# Patient Record
Sex: FEMALE | Race: WHITE | NOT HISPANIC OR LATINO | Employment: FULL TIME | URBAN - METROPOLITAN AREA
[De-identification: names, ages, dates, MRNs, and addresses within clinical notes are randomized per-mention and may not be internally consistent; named-entity substitution may affect disease eponyms.]

---

## 2017-10-26 LAB — EXTERNAL GROUP B STREP ANTIGEN: NEGATIVE

## 2017-11-06 LAB
EXTERNAL ANTIBODY SCREEN: NEGATIVE
EXTERNAL HEPATITIS B SURFACE ANTIGEN: NEGATIVE
EXTERNAL HEPATITIS C AB: NORMAL
EXTERNAL RUBELLA QUALITATIVE: NORMAL
EXTERNAL SYPHILIS RPR SCREEN: NORMAL
HIV1 P24 AG SERPL QL IA: NEGATIVE

## 2017-11-07 ENCOUNTER — HOSPITAL ENCOUNTER (OUTPATIENT)
Dept: LABOR AND DELIVERY | Facility: HOSPITAL | Age: 42
Discharge: HOME | End: 2017-11-07
Attending: OBSTETRICS & GYNECOLOGY | Admitting: OBSTETRICS & GYNECOLOGY

## 2017-11-07 VITALS
SYSTOLIC BLOOD PRESSURE: 115 MMHG | RESPIRATION RATE: 16 BRPM | BODY MASS INDEX: 28.19 KG/M2 | HEART RATE: 83 BPM | TEMPERATURE: 98.1 F | HEIGHT: 68 IN | DIASTOLIC BLOOD PRESSURE: 72 MMHG | WEIGHT: 186 LBS

## 2017-11-07 LAB
ABO GROUP BLD: NORMAL
BASOPHILS # BLD AUTO: 0.02 10*3/MM3 (ref 0–0.2)
BASOPHILS NFR BLD AUTO: 0.3 % (ref 0–1.5)
BLD GP AB SCN SERPL QL: NEGATIVE
DEPRECATED RDW RBC AUTO: 47.9 FL (ref 37–54)
EOSINOPHIL # BLD AUTO: 0.13 10*3/MM3 (ref 0–0.7)
EOSINOPHIL NFR BLD AUTO: 1.7 % (ref 0.3–6.2)
ERYTHROCYTE [DISTWIDTH] IN BLOOD BY AUTOMATED COUNT: 13.3 % (ref 11.7–13)
HCT VFR BLD AUTO: 35.6 % (ref 35.6–45.5)
HGB BLD-MCNC: 12.1 G/DL (ref 11.9–15.5)
IMM GRANULOCYTES # BLD: 0.05 10*3/MM3 (ref 0–0.03)
IMM GRANULOCYTES NFR BLD: 0.6 % (ref 0–0.5)
LYMPHOCYTES # BLD AUTO: 1.25 10*3/MM3 (ref 0.9–4.8)
LYMPHOCYTES NFR BLD AUTO: 16.2 % (ref 19.6–45.3)
MCH RBC QN AUTO: 33.8 PG (ref 26.9–32)
MCHC RBC AUTO-ENTMCNC: 34 G/DL (ref 32.4–36.3)
MCV RBC AUTO: 99.4 FL (ref 80.5–98.2)
MONOCYTES # BLD AUTO: 0.67 10*3/MM3 (ref 0.2–1.2)
MONOCYTES NFR BLD AUTO: 8.7 % (ref 5–12)
NEUTROPHILS # BLD AUTO: 5.6 10*3/MM3 (ref 1.9–8.1)
NEUTROPHILS NFR BLD AUTO: 72.5 % (ref 42.7–76)
PLATELET # BLD AUTO: 150 10*3/MM3 (ref 140–500)
PMV BLD AUTO: 11.7 FL (ref 6–12)
RBC # BLD AUTO: 3.58 10*6/MM3 (ref 3.9–5.2)
RH BLD: POSITIVE
WBC NRBC COR # BLD: 7.72 10*3/MM3 (ref 4.5–10.7)

## 2017-11-07 PROCEDURE — 86850 RBC ANTIBODY SCREEN: CPT | Performed by: OBSTETRICS & GYNECOLOGY

## 2017-11-07 PROCEDURE — 86901 BLOOD TYPING SEROLOGIC RH(D): CPT | Performed by: OBSTETRICS & GYNECOLOGY

## 2017-11-07 PROCEDURE — 86900 BLOOD TYPING SEROLOGIC ABO: CPT | Performed by: OBSTETRICS & GYNECOLOGY

## 2017-11-07 PROCEDURE — 96375 TX/PRO/DX INJ NEW DRUG ADDON: CPT

## 2017-11-07 PROCEDURE — 59412 ANTEPARTUM MANIPULATION: CPT

## 2017-11-07 PROCEDURE — 96374 THER/PROPH/DIAG INJ IV PUSH: CPT

## 2017-11-07 PROCEDURE — 85025 COMPLETE CBC W/AUTO DIFF WBC: CPT | Performed by: OBSTETRICS & GYNECOLOGY

## 2017-11-07 PROCEDURE — 25010000002 BUTORPHANOL PER 1 MG: Performed by: OBSTETRICS & GYNECOLOGY

## 2017-11-07 PROCEDURE — 25010000002 TERBUTALINE PER 1 MG: Performed by: OBSTETRICS & GYNECOLOGY

## 2017-11-07 PROCEDURE — G0463 HOSPITAL OUTPT CLINIC VISIT: HCPCS

## 2017-11-07 PROCEDURE — 25010000002 PROMETHAZINE PER 50 MG: Performed by: OBSTETRICS & GYNECOLOGY

## 2017-11-07 RX ORDER — DIPHENHYDRAMINE HCL 25 MG
25 CAPSULE ORAL EVERY 6 HOURS PRN
COMMUNITY
End: 2019-01-30

## 2017-11-07 RX ORDER — PRENATAL VIT/IRON FUM/FOLIC AC 27MG-0.8MG
1 TABLET ORAL DAILY
COMMUNITY
End: 2019-01-30

## 2017-11-07 RX ORDER — PROMETHAZINE HYDROCHLORIDE 25 MG/ML
25 INJECTION, SOLUTION INTRAMUSCULAR; INTRAVENOUS ONCE
Status: COMPLETED | OUTPATIENT
Start: 2017-11-07 | End: 2017-11-07

## 2017-11-07 RX ORDER — TERBUTALINE SULFATE 1 MG/ML
0.25 INJECTION, SOLUTION SUBCUTANEOUS ONCE
Status: COMPLETED | OUTPATIENT
Start: 2017-11-07 | End: 2017-11-07

## 2017-11-07 RX ORDER — BUTORPHANOL TARTRATE 1 MG/ML
1 INJECTION, SOLUTION INTRAMUSCULAR; INTRAVENOUS ONCE
Status: COMPLETED | OUTPATIENT
Start: 2017-11-07 | End: 2017-11-07

## 2017-11-07 RX ORDER — ZOLPIDEM TARTRATE 10 MG/1
TABLET ORAL
COMMUNITY
Start: 2016-07-22 | End: 2017-11-17 | Stop reason: HOSPADM

## 2017-11-07 RX ADMIN — BUTORPHANOL TARTRATE 1 MG: 1 INJECTION, SOLUTION INTRAMUSCULAR; INTRAVENOUS at 09:26

## 2017-11-07 RX ADMIN — TERBUTALINE SULFATE 0.25 MG: 1 INJECTION SUBCUTANEOUS at 09:25

## 2017-11-07 RX ADMIN — PROMETHAZINE HYDROCHLORIDE 25 MG: 25 INJECTION INTRAMUSCULAR; INTRAVENOUS at 09:31

## 2017-11-07 NOTE — H&P
[facility]  Obstetric History and Physical    Patient Name: Sil Ly  :  1975  MRN:  [id number]      Chief Complaint   Patient presents with   • Routine Prenatal Visit     External version       Subjective     Patient is a 42 y.o. female  currently at 36w5d, who presents for ecv secondary to breech presentation.         Her prenatal care has been  complicated by AMA, poly with an milan of 30 and hx of ivf      Objective       Vital Signs Range for the last 24 hours  Temperature: Temp:  [98.1 °F (36.7 °C)] 98.1 °F (36.7 °C)   Temp Source: Temp src: Oral   BP: BP: (113-115)/(68-72) 115/72   Pulse: Heart Rate:  [83-88] 83   Respirations: Resp:  [16] 16                   Physical Examination:     General :  Alert in NAD  Abdomen: Gravid, EFW breech with head in ruq by leopolds    Presentation:    Cervix: Exam by:     Dilation:     Effacement:     Station:         Fetal Heart Rate Assessment   Method: Fetal HR Assessment Method: external   Beats/min: Fetal HR (Beats/Min): 130   Baseline: Fetal HR Baseline: normal range (110-160 bpm)   Varibility: Fetal HR Variability: moderate (amplitude range 6 to 25 bpm)   Accels: Fetal HR Accelerations: greater than/equal to 15 bpm, lasting at least 15 seconds   Decels: Fetal HR Decelerations: absent   Tracing Category:       Uterine Assessment   Method: Method: TOCO (external toco transducer)   Frequency (min): Contraction Frequency (min): x1   Ctx Count in 10 min: Contraction Frequency in 10min: 50   Duration:     Intensity: Contraction Intensity: no contractions   Intensity by IUPC:     Resting Tone: Uterine Resting Tone: soft by palpation   Resting Tone by IUPC:     [place] Units:             Results from last 7 days  Lab Units 17  0826   WBC 10*3/mm3 7.72   HEMOGLOBIN g/dL 12.1   HEMATOCRIT % 35.6   PLATELETS 10*3/mm3 150       Assessment/Plan     1.  Intrauterine pregnancy at 36w5d weeks gestation with breech pres who desires trial of ecv. She  understands risks of pprom, labor, distress and reversion. FSR on nst and bpp.        gestationActive Problems:    * No active hospital problems. *        H&P updated. The patient was examined and the following changes are noted above.         Montserrat Rangel MD  11/7/2017  9:53 AM

## 2017-11-07 NOTE — DISCHARGE SUMMARY
Date of Discharge:  11/7/2017    Discharge Diagnosis: breech with ecv successful    Presenting Problem/History of Present Illness  There are no admission diagnoses documented for this encounter.       Hospital Course  Patient is a 42 y.o. female presented with for ecv for breech. Done without difficulty. fsr.      Procedures Performed    11/07 0954 EXTERNAL CEPHALIC VERSION    Consults:   Consults     No orders found from 10/9/2017 to 11/8/2017.              Condition on Discharge:  Feels well.  Reports good FM.  No vb.  Ready for discharge    Vital Signs  Temp:  [98.1 °F (36.7 °C)] 98.1 °F (36.7 °C)  Heart Rate:  [83-88] 83  Resp:  [16] 16  BP: (113-115)/(68-72) 115/72    Physical Exam:  General: Awake and alert  Abdomen: Gravid, soft,  non tender  Extremities:  Calves NT bilaterally        Discharge Disposition      Discharge Medications   Sil Ly   Home Medication Instructions SENDY:[id number]    Printed on:11/07/17 1056   Medication Information                      ALPRAZolam (XANAX) 0.5 MG tablet  TAKE 1 TABLET BY MOUTH EVERY DAY             diphenhydrAMINE (BENADRYL) 25 mg capsule  Take 25 mg by mouth Every 6 (Six) Hours As Needed for Itching.             escitalopram (LEXAPRO) 10 MG tablet  TAKE 1 TABLET DAILY             Prenatal Vit-Fe Fumarate-FA (PRENATAL VITAMIN 27-0.8) 27-0.8 MG tablet tablet  Take 1 tablet by mouth Daily.             zolpidem (AMBIEN) 10 MG tablet  TAKE 1 TABLET BY MOUTH AT BEDTIME             zolpidem (AMBIEN) 10 MG tablet  TAKE 1 TABLET BY MOUTH AT BEDTIME                 Discharge Diet:     Activity at Discharge:     Follow-up Appointments  No future appointments.      Test Results Pending at Discharge       Montserrat Rangel MD  11/07/17  10:58 AM

## 2017-11-07 NOTE — NON STRESS TEST
Sil Ly, a  at 36w5d with an DACIA of 2017, by Patient Reported, was seen at [facility] LABOR DELIVERY for a nonstress test.    Chief Complaint   Patient presents with   • Routine Prenatal Visit     External version       Interpretation A  Nonstress Test Interpretation A: Reactive (17 1428 : Lesia Booth RN)

## 2017-11-07 NOTE — PROCEDURES
Pt brought to l/d with breech confirmed and nst reactive, reassuring with no decels. bsus with head is right upper quadrant. Pt given terbutaline, phenergan and stadol. With assistance from dr mercado, the head was grasped and brought to the pelvis on the first try which lasted approximately 2 minutes.  Dr mercado had pressure on the bottom. FHTs immediately checked and were 140s. Pt will be watched for one hour to make sure nst reassuring. ROXANNA

## 2017-11-14 ENCOUNTER — HOSPITAL ENCOUNTER (INPATIENT)
Facility: HOSPITAL | Age: 42
LOS: 3 days | Discharge: HOME | End: 2017-11-17
Attending: OBSTETRICS & GYNECOLOGY | Admitting: OBSTETRICS & GYNECOLOGY

## 2017-11-14 ENCOUNTER — ANESTHESIA (OUTPATIENT)
Dept: LABOR AND DELIVERY | Facility: HOSPITAL | Age: 42
End: 2017-11-14

## 2017-11-14 ENCOUNTER — ANESTHESIA EVENT (OUTPATIENT)
Dept: LABOR AND DELIVERY | Facility: HOSPITAL | Age: 42
End: 2017-11-14

## 2017-11-14 ENCOUNTER — HOSPITAL ENCOUNTER (OUTPATIENT)
Dept: LABOR AND DELIVERY | Facility: HOSPITAL | Age: 42
Setting detail: SURGERY ADMIT
Discharge: HOME | End: 2017-11-14

## 2017-11-14 PROBLEM — Z34.90 PREGNANCY: Status: ACTIVE | Noted: 2017-11-14

## 2017-11-14 LAB
ABO GROUP BLD: NORMAL
ATMOSPHERIC PRESS: 755.1 MMHG
BASE EXCESS BLDCOV CALC-SCNC: -3 MMOL/L (ref -30–30)
BASOPHILS # BLD AUTO: 0.02 10*3/MM3 (ref 0–0.2)
BASOPHILS NFR BLD AUTO: 0.2 % (ref 0–1.5)
BDY SITE: ABNORMAL
BLD GP AB SCN SERPL QL: NEGATIVE
DEPRECATED RDW RBC AUTO: 46.3 FL (ref 37–54)
EOSINOPHIL # BLD AUTO: 0.14 10*3/MM3 (ref 0–0.7)
EOSINOPHIL NFR BLD AUTO: 1.7 % (ref 0.3–6.2)
ERYTHROCYTE [DISTWIDTH] IN BLOOD BY AUTOMATED COUNT: 13 % (ref 11.7–13)
GAS FLOW AIRWAY: 3 LPM
HCO3 BLDCOV-SCNC: 26.6 MMOL/L
HCT VFR BLD AUTO: 37.6 % (ref 35.6–45.5)
HGB BLD-MCNC: 12.7 G/DL (ref 11.9–15.5)
IMM GRANULOCYTES # BLD: 0.04 10*3/MM3 (ref 0–0.03)
IMM GRANULOCYTES NFR BLD: 0.5 % (ref 0–0.5)
LYMPHOCYTES # BLD AUTO: 1.19 10*3/MM3 (ref 0.9–4.8)
LYMPHOCYTES NFR BLD AUTO: 14.4 % (ref 19.6–45.3)
MCH RBC QN AUTO: 33.2 PG (ref 26.9–32)
MCHC RBC AUTO-ENTMCNC: 33.8 G/DL (ref 32.4–36.3)
MCV RBC AUTO: 98.2 FL (ref 80.5–98.2)
MODALITY: ABNORMAL
MONOCYTES # BLD AUTO: 0.68 10*3/MM3 (ref 0.2–1.2)
MONOCYTES NFR BLD AUTO: 8.2 % (ref 5–12)
NEUTROPHILS # BLD AUTO: 6.19 10*3/MM3 (ref 1.9–8.1)
NEUTROPHILS NFR BLD AUTO: 75 % (ref 42.7–76)
PCO2 BLDCOV: 65.8 MM HG (ref 35–51.3)
PH BLDCOV: 7.21 PH UNITS (ref 7.26–7.4)
PLATELET # BLD AUTO: 156 10*3/MM3 (ref 140–500)
PMV BLD AUTO: 12 FL (ref 6–12)
PO2 BLDCOV: 28.5 MM HG (ref 19–39)
RBC # BLD AUTO: 3.83 10*6/MM3 (ref 3.9–5.2)
RH BLD: POSITIVE
SAO2 % BLDCOA: 40.3 % (ref 92–99)
SAO2 % BLDCOV: ABNORMAL %
WBC NRBC COR # BLD: 8.26 10*3/MM3 (ref 4.5–10.7)

## 2017-11-14 PROCEDURE — 25010000002 ONDANSETRON PER 1 MG: Performed by: ANESTHESIOLOGY

## 2017-11-14 PROCEDURE — 25010000002 MORPHINE PER 10 MG: Performed by: NURSE ANESTHETIST, CERTIFIED REGISTERED

## 2017-11-14 PROCEDURE — 25010000002 KETOROLAC TROMETHAMINE PER 15 MG: Performed by: NURSE ANESTHETIST, CERTIFIED REGISTERED

## 2017-11-14 PROCEDURE — 82803 BLOOD GASES ANY COMBINATION: CPT

## 2017-11-14 PROCEDURE — 86901 BLOOD TYPING SEROLOGIC RH(D): CPT | Performed by: OBSTETRICS & GYNECOLOGY

## 2017-11-14 PROCEDURE — C1755 CATHETER, INTRASPINAL: HCPCS | Performed by: ANESTHESIOLOGY

## 2017-11-14 PROCEDURE — 88307 TISSUE EXAM BY PATHOLOGIST: CPT

## 2017-11-14 PROCEDURE — 25010000003 CEFAZOLIN IN DEXTROSE 2-4 GM/100ML-% SOLUTION: Performed by: OBSTETRICS & GYNECOLOGY

## 2017-11-14 PROCEDURE — 86900 BLOOD TYPING SEROLOGIC ABO: CPT | Performed by: OBSTETRICS & GYNECOLOGY

## 2017-11-14 PROCEDURE — 25010000002 PHENYLEPHRINE PER 1 ML: Performed by: NURSE ANESTHETIST, CERTIFIED REGISTERED

## 2017-11-14 PROCEDURE — 85025 COMPLETE CBC W/AUTO DIFF WBC: CPT | Performed by: OBSTETRICS & GYNECOLOGY

## 2017-11-14 PROCEDURE — 86850 RBC ANTIBODY SCREEN: CPT | Performed by: OBSTETRICS & GYNECOLOGY

## 2017-11-14 RX ORDER — MORPHINE SULFATE 2 MG/ML
2 INJECTION, SOLUTION INTRAMUSCULAR; INTRAVENOUS EVERY 4 HOURS PRN
Status: DISCONTINUED | OUTPATIENT
Start: 2017-11-14 | End: 2017-11-17 | Stop reason: HOSPADM

## 2017-11-14 RX ORDER — SODIUM CHLORIDE 0.9 % (FLUSH) 0.9 %
1-10 SYRINGE (ML) INJECTION AS NEEDED
Status: DISCONTINUED | OUTPATIENT
Start: 2017-11-14 | End: 2017-11-17 | Stop reason: HOSPADM

## 2017-11-14 RX ORDER — FAMOTIDINE 10 MG/ML
20 INJECTION, SOLUTION INTRAVENOUS ONCE AS NEEDED
Status: DISCONTINUED | OUTPATIENT
Start: 2017-11-14 | End: 2017-11-14 | Stop reason: HOSPADM

## 2017-11-14 RX ORDER — ONDANSETRON 2 MG/ML
4 INJECTION INTRAMUSCULAR; INTRAVENOUS ONCE AS NEEDED
Status: ACTIVE | OUTPATIENT
Start: 2017-11-14 | End: 2017-11-15

## 2017-11-14 RX ORDER — HYDROMORPHONE HYDROCHLORIDE 1 MG/ML
0.5 INJECTION, SOLUTION INTRAMUSCULAR; INTRAVENOUS; SUBCUTANEOUS
Status: DISCONTINUED | OUTPATIENT
Start: 2017-11-14 | End: 2017-11-17 | Stop reason: HOSPADM

## 2017-11-14 RX ORDER — OXYTOCIN/RINGER'S LACTATE 10/500ML
PLASTIC BAG, INJECTION (ML) INTRAVENOUS CONTINUOUS PRN
Status: DISCONTINUED | OUTPATIENT
Start: 2017-11-14 | End: 2017-11-14 | Stop reason: SURG

## 2017-11-14 RX ORDER — OXYCODONE HYDROCHLORIDE AND ACETAMINOPHEN 5; 325 MG/1; MG/1
1 TABLET ORAL EVERY 4 HOURS PRN
Status: DISCONTINUED | OUTPATIENT
Start: 2017-11-14 | End: 2017-11-17 | Stop reason: HOSPADM

## 2017-11-14 RX ORDER — OXYTOCIN/RINGER'S LACTATE 10/500ML
125 PLASTIC BAG, INJECTION (ML) INTRAVENOUS CONTINUOUS PRN
Status: DISCONTINUED | OUTPATIENT
Start: 2017-11-14 | End: 2017-11-14 | Stop reason: HOSPADM

## 2017-11-14 RX ORDER — ACETAMINOPHEN 325 MG/1
650 TABLET ORAL EVERY 4 HOURS PRN
Status: DISCONTINUED | OUTPATIENT
Start: 2017-11-14 | End: 2017-11-17 | Stop reason: HOSPADM

## 2017-11-14 RX ORDER — ONDANSETRON 4 MG/1
4 TABLET, ORALLY DISINTEGRATING ORAL EVERY 6 HOURS PRN
Status: DISCONTINUED | OUTPATIENT
Start: 2017-11-14 | End: 2017-11-17 | Stop reason: HOSPADM

## 2017-11-14 RX ORDER — PROMETHAZINE HYDROCHLORIDE 25 MG/1
12.5 SUPPOSITORY RECTAL EVERY 6 HOURS PRN
Status: DISCONTINUED | OUTPATIENT
Start: 2017-11-14 | End: 2017-11-17 | Stop reason: HOSPADM

## 2017-11-14 RX ORDER — SIMETHICONE 80 MG
80 TABLET,CHEWABLE ORAL 4 TIMES DAILY PRN
Status: DISCONTINUED | OUTPATIENT
Start: 2017-11-14 | End: 2017-11-17 | Stop reason: HOSPADM

## 2017-11-14 RX ORDER — HYDROXYZINE 50 MG/1
50 TABLET, FILM COATED ORAL EVERY 6 HOURS PRN
Status: DISCONTINUED | OUTPATIENT
Start: 2017-11-14 | End: 2017-11-17 | Stop reason: HOSPADM

## 2017-11-14 RX ORDER — PROMETHAZINE HYDROCHLORIDE 25 MG/ML
12.5 INJECTION, SOLUTION INTRAMUSCULAR; INTRAVENOUS EVERY 6 HOURS PRN
Status: DISCONTINUED | OUTPATIENT
Start: 2017-11-14 | End: 2017-11-17 | Stop reason: HOSPADM

## 2017-11-14 RX ORDER — NALOXONE HCL 0.4 MG/ML
0.4 VIAL (ML) INJECTION
Status: DISCONTINUED | OUTPATIENT
Start: 2017-11-14 | End: 2017-11-17 | Stop reason: HOSPADM

## 2017-11-14 RX ORDER — OXYTOCIN/RINGER'S LACTATE 10/500ML
PLASTIC BAG, INJECTION (ML) INTRAVENOUS
Status: DISPENSED
Start: 2017-11-14 | End: 2017-11-14

## 2017-11-14 RX ORDER — PROMETHAZINE HYDROCHLORIDE 25 MG/1
25 TABLET ORAL EVERY 6 HOURS PRN
Status: DISCONTINUED | OUTPATIENT
Start: 2017-11-14 | End: 2017-11-17 | Stop reason: HOSPADM

## 2017-11-14 RX ORDER — CEFAZOLIN SODIUM 2 G/100ML
2 INJECTION, SOLUTION INTRAVENOUS ONCE
Status: COMPLETED | OUTPATIENT
Start: 2017-11-14 | End: 2017-11-14

## 2017-11-14 RX ORDER — DIPHENHYDRAMINE HYDROCHLORIDE 50 MG/ML
25 INJECTION INTRAMUSCULAR; INTRAVENOUS EVERY 4 HOURS PRN
Status: DISCONTINUED | OUTPATIENT
Start: 2017-11-14 | End: 2017-11-17 | Stop reason: HOSPADM

## 2017-11-14 RX ORDER — MISOPROSTOL 200 UG/1
600 TABLET ORAL ONCE AS NEEDED
Status: DISCONTINUED | OUTPATIENT
Start: 2017-11-14 | End: 2017-11-17 | Stop reason: HOSPADM

## 2017-11-14 RX ORDER — MORPHINE SULFATE 1 MG/ML
INJECTION, SOLUTION EPIDURAL; INTRATHECAL; INTRAVENOUS AS NEEDED
Status: DISCONTINUED | OUTPATIENT
Start: 2017-11-14 | End: 2017-11-14 | Stop reason: SURG

## 2017-11-14 RX ORDER — SODIUM CHLORIDE 0.9 % (FLUSH) 0.9 %
1-10 SYRINGE (ML) INJECTION AS NEEDED
Status: DISCONTINUED | OUTPATIENT
Start: 2017-11-14 | End: 2017-11-14 | Stop reason: HOSPADM

## 2017-11-14 RX ORDER — LIDOCAINE HYDROCHLORIDE 10 MG/ML
5 INJECTION, SOLUTION INFILTRATION; PERINEURAL AS NEEDED
Status: DISCONTINUED | OUTPATIENT
Start: 2017-11-14 | End: 2017-11-14 | Stop reason: HOSPADM

## 2017-11-14 RX ORDER — LIDOCAINE HYDROCHLORIDE AND EPINEPHRINE 15; 5 MG/ML; UG/ML
INJECTION, SOLUTION EPIDURAL AS NEEDED
Status: DISCONTINUED | OUTPATIENT
Start: 2017-11-14 | End: 2017-11-14 | Stop reason: SURG

## 2017-11-14 RX ORDER — PHYTONADIONE 1 MG/.5ML
INJECTION, EMULSION INTRAMUSCULAR; INTRAVENOUS; SUBCUTANEOUS
Status: DISPENSED
Start: 2017-11-14 | End: 2017-11-14

## 2017-11-14 RX ORDER — NALOXONE HCL 0.4 MG/ML
0.2 VIAL (ML) INJECTION
Status: DISCONTINUED | OUTPATIENT
Start: 2017-11-14 | End: 2017-11-17 | Stop reason: HOSPADM

## 2017-11-14 RX ORDER — OXYCODONE HYDROCHLORIDE AND ACETAMINOPHEN 5; 325 MG/1; MG/1
2 TABLET ORAL EVERY 4 HOURS PRN
Status: DISCONTINUED | OUTPATIENT
Start: 2017-11-14 | End: 2017-11-17 | Stop reason: HOSPADM

## 2017-11-14 RX ORDER — MISOPROSTOL 200 UG/1
800 TABLET ORAL AS NEEDED
Status: DISCONTINUED | OUTPATIENT
Start: 2017-11-14 | End: 2017-11-14 | Stop reason: HOSPADM

## 2017-11-14 RX ORDER — MORPHINE SULFATE 1 MG/ML
INJECTION, SOLUTION EPIDURAL; INTRATHECAL; INTRAVENOUS AS NEEDED
Status: DISCONTINUED | OUTPATIENT
Start: 2017-11-14 | End: 2017-11-14

## 2017-11-14 RX ORDER — DIPHENHYDRAMINE HCL 25 MG
25 CAPSULE ORAL EVERY 4 HOURS PRN
Status: DISCONTINUED | OUTPATIENT
Start: 2017-11-14 | End: 2017-11-17 | Stop reason: HOSPADM

## 2017-11-14 RX ORDER — METHYLERGONOVINE MALEATE 0.2 MG/ML
200 INJECTION INTRAVENOUS ONCE AS NEEDED
Status: DISCONTINUED | OUTPATIENT
Start: 2017-11-14 | End: 2017-11-14 | Stop reason: HOSPADM

## 2017-11-14 RX ORDER — OXYTOCIN/RINGER'S LACTATE 10/500ML
125 PLASTIC BAG, INJECTION (ML) INTRAVENOUS CONTINUOUS PRN
Status: ACTIVE | OUTPATIENT
Start: 2017-11-14 | End: 2017-11-15

## 2017-11-14 RX ORDER — ONDANSETRON 4 MG/1
4 TABLET, FILM COATED ORAL EVERY 6 HOURS PRN
Status: DISCONTINUED | OUTPATIENT
Start: 2017-11-14 | End: 2017-11-17 | Stop reason: HOSPADM

## 2017-11-14 RX ORDER — LIDOCAINE HYDROCHLORIDE AND EPINEPHRINE 20; 5 MG/ML; UG/ML
INJECTION, SOLUTION EPIDURAL; INFILTRATION; INTRACAUDAL; PERINEURAL AS NEEDED
Status: DISCONTINUED | OUTPATIENT
Start: 2017-11-14 | End: 2017-11-14 | Stop reason: SURG

## 2017-11-14 RX ORDER — SODIUM CHLORIDE, SODIUM LACTATE, POTASSIUM CHLORIDE, CALCIUM CHLORIDE 600; 310; 30; 20 MG/100ML; MG/100ML; MG/100ML; MG/100ML
125 INJECTION, SOLUTION INTRAVENOUS CONTINUOUS
Status: DISCONTINUED | OUTPATIENT
Start: 2017-11-14 | End: 2017-11-14

## 2017-11-14 RX ORDER — DOCUSATE SODIUM 100 MG/1
100 CAPSULE, LIQUID FILLED ORAL 2 TIMES DAILY PRN
Status: DISCONTINUED | OUTPATIENT
Start: 2017-11-14 | End: 2017-11-17 | Stop reason: HOSPADM

## 2017-11-14 RX ORDER — KETOROLAC TROMETHAMINE 30 MG/ML
INJECTION, SOLUTION INTRAMUSCULAR; INTRAVENOUS AS NEEDED
Status: DISCONTINUED | OUTPATIENT
Start: 2017-11-14 | End: 2017-11-14 | Stop reason: SURG

## 2017-11-14 RX ORDER — ERYTHROMYCIN 5 MG/G
OINTMENT OPHTHALMIC
Status: DISPENSED
Start: 2017-11-14 | End: 2017-11-14

## 2017-11-14 RX ORDER — ONDANSETRON 2 MG/ML
4 INJECTION INTRAMUSCULAR; INTRAVENOUS EVERY 6 HOURS PRN
Status: DISCONTINUED | OUTPATIENT
Start: 2017-11-14 | End: 2017-11-17 | Stop reason: HOSPADM

## 2017-11-14 RX ORDER — IBUPROFEN 800 MG/1
800 TABLET ORAL EVERY 8 HOURS PRN
Status: DISCONTINUED | OUTPATIENT
Start: 2017-11-14 | End: 2017-11-17 | Stop reason: HOSPADM

## 2017-11-14 RX ORDER — LANOLIN 100 %
OINTMENT (GRAM) TOPICAL
Status: DISCONTINUED | OUTPATIENT
Start: 2017-11-14 | End: 2017-11-17 | Stop reason: HOSPADM

## 2017-11-14 RX ORDER — CARBOPROST TROMETHAMINE 250 UG/ML
250 INJECTION, SOLUTION INTRAMUSCULAR AS NEEDED
Status: DISCONTINUED | OUTPATIENT
Start: 2017-11-14 | End: 2017-11-14 | Stop reason: HOSPADM

## 2017-11-14 RX ORDER — OXYTOCIN/RINGER'S LACTATE 10/500ML
999 PLASTIC BAG, INJECTION (ML) INTRAVENOUS ONCE
Status: DISCONTINUED | OUTPATIENT
Start: 2017-11-14 | End: 2017-11-14 | Stop reason: HOSPADM

## 2017-11-14 RX ORDER — ONDANSETRON 2 MG/ML
4 INJECTION INTRAMUSCULAR; INTRAVENOUS ONCE AS NEEDED
Status: COMPLETED | OUTPATIENT
Start: 2017-11-14 | End: 2017-11-14

## 2017-11-14 RX ORDER — DIPHENHYDRAMINE HYDROCHLORIDE 50 MG/ML
12.5 INJECTION INTRAMUSCULAR; INTRAVENOUS EVERY 8 HOURS PRN
Status: DISCONTINUED | OUTPATIENT
Start: 2017-11-14 | End: 2017-11-14 | Stop reason: HOSPADM

## 2017-11-14 RX ORDER — EPHEDRINE SULFATE 50 MG/ML
5 INJECTION, SOLUTION INTRAVENOUS AS NEEDED
Status: DISCONTINUED | OUTPATIENT
Start: 2017-11-14 | End: 2017-11-14 | Stop reason: HOSPADM

## 2017-11-14 RX ORDER — OXYTOCIN/RINGER'S LACTATE 10/500ML
999 PLASTIC BAG, INJECTION (ML) INTRAVENOUS ONCE
Status: DISCONTINUED | OUTPATIENT
Start: 2017-11-14 | End: 2017-11-17 | Stop reason: HOSPADM

## 2017-11-14 RX ORDER — PHYTONADIONE 2 MG/ML
INJECTION, EMULSION INTRAMUSCULAR; INTRAVENOUS; SUBCUTANEOUS
Status: DISPENSED
Start: 2017-11-14 | End: 2017-11-14

## 2017-11-14 RX ORDER — BISACODYL 10 MG
10 SUPPOSITORY, RECTAL RECTAL DAILY PRN
Status: DISCONTINUED | OUTPATIENT
Start: 2017-11-14 | End: 2017-11-17 | Stop reason: HOSPADM

## 2017-11-14 RX ADMIN — OXYCODONE HYDROCHLORIDE AND ACETAMINOPHEN 1 TABLET: 5; 325 TABLET ORAL at 20:45

## 2017-11-14 RX ADMIN — EPHEDRINE SULFATE 10 MG: 50 INJECTION INTRAMUSCULAR; INTRAVENOUS; SUBCUTANEOUS at 10:32

## 2017-11-14 RX ADMIN — IBUPROFEN 800 MG: 800 TABLET ORAL at 16:30

## 2017-11-14 RX ADMIN — PHENYLEPHRINE HYDROCHLORIDE 100 MCG: 10 INJECTION INTRAVENOUS at 10:53

## 2017-11-14 RX ADMIN — PHENYLEPHRINE HYDROCHLORIDE 100 MCG: 10 INJECTION INTRAVENOUS at 11:01

## 2017-11-14 RX ADMIN — Medication 1 APPLICATION: at 16:31

## 2017-11-14 RX ADMIN — PHENYLEPHRINE HYDROCHLORIDE 100 MCG: 10 INJECTION INTRAVENOUS at 12:04

## 2017-11-14 RX ADMIN — PHENYLEPHRINE HYDROCHLORIDE 100 MCG: 10 INJECTION INTRAVENOUS at 11:17

## 2017-11-14 RX ADMIN — EPHEDRINE SULFATE 5 MG: 50 INJECTION, SOLUTION INTRAVENOUS at 10:26

## 2017-11-14 RX ADMIN — ONDANSETRON 4 MG: 2 INJECTION INTRAMUSCULAR; INTRAVENOUS at 10:55

## 2017-11-14 RX ADMIN — LIDOCAINE HYDROCHLORIDE AND EPINEPHRINE 4 ML: 15; 5 INJECTION, SOLUTION EPIDURAL at 09:18

## 2017-11-14 RX ADMIN — OXYTOCIN 600 ML/HR: 10 INJECTION, SOLUTION INTRAMUSCULAR; INTRAVENOUS at 11:30

## 2017-11-14 RX ADMIN — OXYTOCIN 1200 ML/HR: 10 INJECTION, SOLUTION INTRAMUSCULAR; INTRAVENOUS at 11:12

## 2017-11-14 RX ADMIN — OXYCODONE HYDROCHLORIDE AND ACETAMINOPHEN 1 TABLET: 5; 325 TABLET ORAL at 16:31

## 2017-11-14 RX ADMIN — LIDOCAINE HYDROCHLORIDE,EPINEPHRINE BITARTRATE 5 ML: 20; .005 INJECTION, SOLUTION EPIDURAL; INFILTRATION; INTRACAUDAL; PERINEURAL at 10:45

## 2017-11-14 RX ADMIN — PHENYLEPHRINE HYDROCHLORIDE 100 MCG: 10 INJECTION INTRAVENOUS at 12:01

## 2017-11-14 RX ADMIN — LIDOCAINE HYDROCHLORIDE,EPINEPHRINE BITARTRATE 5 ML: 20; .005 INJECTION, SOLUTION EPIDURAL; INFILTRATION; INTRACAUDAL; PERINEURAL at 10:40

## 2017-11-14 RX ADMIN — CEFAZOLIN SODIUM 2 G: 2 INJECTION, SOLUTION INTRAVENOUS at 10:45

## 2017-11-14 RX ADMIN — PHENYLEPHRINE HYDROCHLORIDE 200 MCG: 10 INJECTION INTRAVENOUS at 10:55

## 2017-11-14 RX ADMIN — Medication 96 ML/HR: at 09:19

## 2017-11-14 RX ADMIN — PHENYLEPHRINE HYDROCHLORIDE 100 MCG: 10 INJECTION INTRAVENOUS at 12:03

## 2017-11-14 RX ADMIN — SODIUM CHLORIDE, POTASSIUM CHLORIDE, SODIUM LACTATE AND CALCIUM CHLORIDE 1000 ML: 600; 310; 30; 20 INJECTION, SOLUTION INTRAVENOUS at 08:20

## 2017-11-14 RX ADMIN — OXYTOCIN 125 ML/HR: 10 INJECTION INTRAVENOUS at 13:06

## 2017-11-14 RX ADMIN — SODIUM CHLORIDE, POTASSIUM CHLORIDE, SODIUM LACTATE AND CALCIUM CHLORIDE 125 ML/HR: 600; 310; 30; 20 INJECTION, SOLUTION INTRAVENOUS at 09:23

## 2017-11-14 RX ADMIN — MORPHINE SULFATE 2.5 MG: 1 INJECTION EPIDURAL; INTRATHECAL; INTRAVENOUS at 10:45

## 2017-11-14 RX ADMIN — KETOROLAC TROMETHAMINE 30 MG: 30 INJECTION, SOLUTION INTRAMUSCULAR; INTRAVENOUS at 10:57

## 2017-11-14 NOTE — NURSING NOTE
Fetal presentation is breech, confirmed with BIN Valencia RN per bedside US.    Lyndsay Mello RN

## 2017-11-14 NOTE — L&D DELIVERY NOTE
[facility]   Section Operative Note    Patient Name: Sil Ly  :  1975  MRN:  [id number]      Date of procedure:  2017        Pre-Operative Dx:   1.  IUP at 38w2d weeks with poly   2.   breech presentation        Postoperative Dx:  Same        Procedure: , Low Transverse      Surgeon: Montserrat Rangel MD      Assistant: Dr schulz     Anesthesia: Epidural    EBL: 1000         Specimens: placenta     Findings:                           Amniotic Fluid copious and clear  Placenta Intact, 3 VC  Uterus normal  Tubes and ovaries normal bilaterally              Infant:      Infant's Name: Dariela     Gender: Viable female  infant     Weight: 8 lb 8.2 oz (3.86 kg)     Apgars: 8   @ 1 minute /     9   @ 5 minutes                 Indication for C/Section:          Pt presented to l/d for ecv after epidural placed. It was successful but baby immediately turned back to breech.             Procedure Details:  The patient was taken to the operating room where epidural anesthesia was found to be adequate. She was prepped and draped in the usual sterile manner in the dorsal supine position with leftward tilt. A Pfannenstiel incision was made and carried down to the fascia. The fascia was incised in the mid-line and extended transversely with Padron scissors. The fascia was grasped and elevated and  from the underlying rectus muscles superiorly and inferiorly. The peritoneum was identified and entered. Peritoneal incision was extended superiorly and inferiorly with good visualization of the bladder. The bladder blade was inserted and the vesicouterine peritoneum was incised transversely and the bladder flap was created digitally. The bladder blade was reinserted. The  lower uterine segment was incised in a transverse fashion.  The feet were brought through the incision followed by the buttocks, torson, arms and the head was easily flexed and brought through the incision. The umbilical cord  was clamped and cut and the infant was handed off the field. Cord ph and cord bloods were obtained. The placenta was removed intact and appeared normal. The uterine incision was closed with running locked sutures of 0vicryl. Second layer closure was placed imbricating the first for hemostasis. The abdominal cavity was irrigated and cleared of all clot and debris. The uterine incision was inspected and noted to be hemostatic. Rectus muscles were reapproximated in the midline with 0 vicryl.  The fascia was closed with 0 vicryl in running continuous fashion. The subcutaneous tissue was closed with 3-0 vicryl. The skin was closed in subcuticular fashion with 4-0 Vicryl.   Instrument, sponge, and needle counts were correct prior the abdominal closure and at the conclusion of the case. Mother and baby  to recovery room in stable condition.              Complications:     None          Antibiotics: cefazolin (Ancef)                      Montserrat Rangel MD  11/14/2017  1:47 PM

## 2017-11-14 NOTE — ANESTHESIA PREPROCEDURE EVALUATION
Anesthesia Evaluation     Patient summary reviewed   no history of anesthetic complications:  NPO Solid Status: > 6 hours  NPO Liquid Status: > 2 hours     Airway   Mallampati: II  TM distance: >3 FB  Neck ROM: full  no difficulty expected  Dental - normal exam     Pulmonary - negative pulmonary ROS and normal exam   Cardiovascular - negative cardio ROS and normal exam        Neuro/Psych  GI/Hepatic/Renal/Endo      Musculoskeletal     Abdominal    Substance History      OB/GYN    (+) Pregnant (non-vertex for external cephalic version, if successful then labor if unable then  section),         Other        (-) blood dyscrasia                                  Anesthesia Plan    ASA 2     epidural     Anesthetic plan and risks discussed with patient.

## 2017-11-14 NOTE — H&P
Pt presents today with breech presentation, polyhydramnios and ama for delivery.  pnc reviewed. Baby was successfully verted per her request but head was not near pelvis.  aracelis patient options and will move forward with csection, KMM

## 2017-11-14 NOTE — PLAN OF CARE
Problem: Fall Risk  (Adult,Obstetrics,Pediatric)  Goal: Identify Related Risk Factors and Signs and Symptoms  Outcome: Ongoing (interventions implemented as appropriate)    17   Fall Risk    Fall Risk: Related Risk Factors regional anesthesia       Goal: Absence of Maternal Fall  Outcome: Ongoing (interventions implemented as appropriate)    17   Fall Risk  (Adult,Obstetrics,Pediatric)   Absence of Maternal Fall making progress toward outcome       Goal: Absence of [place] Fall/Drop  Outcome: Ongoing (interventions implemented as appropriate)    17   Fall Risk  (Adult,Obstetrics,Pediatric)   Absence of [place] Fall/Drop making progress toward outcome

## 2017-11-14 NOTE — PLAN OF CARE
Problem: Patient Care Overview (Adult)  Goal: Plan of Care Review  Outcome: Ongoing (interventions implemented as appropriate)    17 1216   Coping/Psychosocial Response Interventions   Plan Of Care Reviewed With patient   Patient Care Overview   Progress improving       Goal: Adult Individualization and Mutuality  Outcome: Ongoing (interventions implemented as appropriate)    17 0957   Individualization   Patient Specific Preferences Attempt Version, prefers VD if possible.   Patient Specific Goals Vaginal Delivery if possible/safe for baby   Patient Specific Interventions Epidural for comfort.    Mutuality/Individual Preferences   What Anxieties, Fears or Concerns Do You Have About Your Health or Care? None at this time   What Questions Do You Have About Your Health or Care? None at this time   What Information Would Help Us Give You More Personalized Care? Pt prefers to try version but is comfortable with whatever method of delivery is safest       Goal: Discharge Needs Assessment  Outcome: Ongoing (interventions implemented as appropriate)    17 1216   Discharge Needs Assessment   Concerns To Be Addressed no discharge needs identified         Problem:  Delivery (Adult,Obstetrics,Pediatric)  Goal: Signs and Symptoms of Listed Potential Problems Will be Absent or Manageable ( Delivery)  Outcome: Ongoing (interventions implemented as appropriate)    17 1216    Delivery   Problems Assessed ( Delivery) all   Problems Present ( Delivery) none

## 2017-11-14 NOTE — ANESTHESIA POSTPROCEDURE EVALUATION
Patient: Sil Ly    Procedure Summary     Date Anesthesia Start Anesthesia Stop Room / Location    17 0949 1205  [facility]   [facility]       Procedure Diagnosis Surgeon Provider     SECTION PRIMARY (N/A Abdomen) No diagnosis on file. MD Franklin Light MD          Anesthesia Type: epidural  Last vitals  BP   94/59 (17 1330)   Temp   36.3 °C (97.3 °F) (17 1200)   Pulse   69 (17 1330)   Resp   16 (17 1330)     SpO2   97 % (17 1320)     Post Anesthesia Care and Evaluation    Patient location during evaluation: PACU  Anesthetic complications: No anesthetic complications

## 2017-11-14 NOTE — ANESTHESIA PROCEDURE NOTES
Labor Epidural    Patient location during procedure: OB  Performed By  Anesthesiologist: BRANDYN LAIRD  Preanesthetic Checklist  Completed: patient identified, site marked, surgical consent, pre-op evaluation, timeout performed, IV checked, risks and benefits discussed and monitors and equipment checked  Prep:  Pt Position:sitting  Sterile Tech:gloves, sterile barrier and cap  Prep:chlorhexidine gluconate and isopropyl alcohol  Monitoring:blood pressure monitoring, continuous pulse oximetry and EKG  Epidural Block Procedure:  Approach:midline  Guidance:palpation technique  Location:L2-L3  Needle Type:Tuohy  Needle Gauge:17  Loss of Resistance Medium: saline  Loss of Resistance: 5cm  Cath Depth at skin:13 cm  Paresthesia: none  Aspiration:negative  Test Dose:negative  Number of Attempts: 1  Post Assessment:  Dressing:occlusive dressing applied and secured with tape  Pt Tolerance:patient tolerated the procedure well with no apparent complications

## 2017-11-15 LAB
BASOPHILS # BLD AUTO: 0.03 10*3/MM3 (ref 0–0.2)
BASOPHILS NFR BLD AUTO: 0.3 % (ref 0–1.5)
DEPRECATED RDW RBC AUTO: 47.7 FL (ref 37–54)
EOSINOPHIL # BLD AUTO: 0.12 10*3/MM3 (ref 0–0.7)
EOSINOPHIL NFR BLD AUTO: 1.3 % (ref 0.3–6.2)
ERYTHROCYTE [DISTWIDTH] IN BLOOD BY AUTOMATED COUNT: 13.2 % (ref 11.7–13)
HCT VFR BLD AUTO: 27.7 % (ref 35.6–45.5)
HGB BLD-MCNC: 9.6 G/DL (ref 11.9–15.5)
IMM GRANULOCYTES # BLD: 0.02 10*3/MM3 (ref 0–0.03)
IMM GRANULOCYTES NFR BLD: 0.2 % (ref 0–0.5)
LYMPHOCYTES # BLD AUTO: 1.49 10*3/MM3 (ref 0.9–4.8)
LYMPHOCYTES NFR BLD AUTO: 16.2 % (ref 19.6–45.3)
MCH RBC QN AUTO: 34.4 PG (ref 26.9–32)
MCHC RBC AUTO-ENTMCNC: 34.7 G/DL (ref 32.4–36.3)
MCV RBC AUTO: 99.3 FL (ref 80.5–98.2)
MONOCYTES # BLD AUTO: 0.68 10*3/MM3 (ref 0.2–1.2)
MONOCYTES NFR BLD AUTO: 7.4 % (ref 5–12)
NEUTROPHILS # BLD AUTO: 6.84 10*3/MM3 (ref 1.9–8.1)
NEUTROPHILS NFR BLD AUTO: 74.6 % (ref 42.7–76)
PLATELET # BLD AUTO: 149 10*3/MM3 (ref 140–500)
PMV BLD AUTO: 12 FL (ref 6–12)
RBC # BLD AUTO: 2.79 10*6/MM3 (ref 3.9–5.2)
WBC NRBC COR # BLD: 9.18 10*3/MM3 (ref 4.5–10.7)

## 2017-11-15 PROCEDURE — 85025 COMPLETE CBC W/AUTO DIFF WBC: CPT | Performed by: OBSTETRICS & GYNECOLOGY

## 2017-11-15 RX ORDER — ZOLPIDEM TARTRATE 5 MG/1
5 TABLET ORAL NIGHTLY PRN
Status: DISCONTINUED | OUTPATIENT
Start: 2017-11-15 | End: 2017-11-17 | Stop reason: HOSPADM

## 2017-11-15 RX ADMIN — OXYCODONE HYDROCHLORIDE AND ACETAMINOPHEN 1 TABLET: 5; 325 TABLET ORAL at 06:44

## 2017-11-15 RX ADMIN — DOCUSATE SODIUM 100 MG: 100 CAPSULE, LIQUID FILLED ORAL at 22:10

## 2017-11-15 RX ADMIN — OXYCODONE HYDROCHLORIDE AND ACETAMINOPHEN 2 TABLET: 5; 325 TABLET ORAL at 10:43

## 2017-11-15 RX ADMIN — SIMETHICONE CHEW TAB 80 MG 80 MG: 80 TABLET ORAL at 22:10

## 2017-11-15 RX ADMIN — IBUPROFEN 800 MG: 800 TABLET ORAL at 01:50

## 2017-11-15 RX ADMIN — IBUPROFEN 800 MG: 800 TABLET ORAL at 14:51

## 2017-11-15 RX ADMIN — OXYCODONE HYDROCHLORIDE AND ACETAMINOPHEN 2 TABLET: 5; 325 TABLET ORAL at 19:02

## 2017-11-15 RX ADMIN — DOCUSATE SODIUM 100 MG: 100 CAPSULE, LIQUID FILLED ORAL at 10:43

## 2017-11-15 RX ADMIN — OXYCODONE HYDROCHLORIDE AND ACETAMINOPHEN 2 TABLET: 5; 325 TABLET ORAL at 23:04

## 2017-11-15 RX ADMIN — HYDROXYZINE HYDROCHLORIDE 50 MG: 50 TABLET, FILM COATED ORAL at 11:56

## 2017-11-15 RX ADMIN — ZOLPIDEM TARTRATE 5 MG: 5 TABLET ORAL at 22:10

## 2017-11-15 RX ADMIN — HYDROXYZINE HYDROCHLORIDE 50 MG: 50 TABLET, FILM COATED ORAL at 02:00

## 2017-11-15 RX ADMIN — OXYCODONE HYDROCHLORIDE AND ACETAMINOPHEN 1 TABLET: 5; 325 TABLET ORAL at 01:50

## 2017-11-15 RX ADMIN — SIMETHICONE CHEW TAB 80 MG 80 MG: 80 TABLET ORAL at 10:43

## 2017-11-15 RX ADMIN — OXYCODONE HYDROCHLORIDE AND ACETAMINOPHEN 2 TABLET: 5; 325 TABLET ORAL at 14:51

## 2017-11-15 RX ADMIN — IBUPROFEN 800 MG: 800 TABLET ORAL at 23:04

## 2017-11-15 NOTE — PLAN OF CARE
Problem: Patient Care Overview (Adult)  Goal: Plan of Care Review  Outcome: Ongoing (interventions implemented as appropriate)    17   Coping/Psychosocial Response Interventions   Plan Of Care Reviewed With patient;significant other   Patient Care Overview   Progress improving   Outcome Evaluation   Outcome Summary/Follow up Plan Vitals stable; bleeding and fundus WNL; ambulated to the bathroom; breastfeeding well       Goal: Adult Individualization and Mutuality  Outcome: Ongoing (interventions implemented as appropriate)    17   Individualization   Patient Specific Preferences Breastfeeding   Patient Specific Goals Pain management       Goal: Discharge Needs Assessment  Outcome: Ongoing (interventions implemented as appropriate)    17   Discharge Needs Assessment   Concerns To Be Addressed no discharge needs identified   Readmission Within The Last 30 Days no previous admission in last 30 days   Discharge Disposition home or self-care         Problem: Postpartum ( Delivery) (Adult)  Goal: Signs and Symptoms of Listed Potential Problems Will be Absent or Manageable (Postpartum)  Outcome: Outcome(s) achieved Date Met:  17   Postpartum ( Delivery)   Problems Assessed (Postpartum ) all   Problems Present (Postpartum ) pain         Problem: Breastfeeding (Adult,NICU,[place],Obstetrics,Pediatric)  Goal: Signs and Symptoms of Listed Potential Problems Will be Absent or Manageable (Breastfeeding)  Outcome: Ongoing (interventions implemented as appropriate)    17   Breastfeeding   Problems Assessed (Breastfeeding) all   Problems Present (Breastfeeding) none

## 2017-11-15 NOTE — PROGRESS NOTES
[facility]   PROGRESS NOTE    Patient Name: Sil Ly  :  1975  MRN:  [id number]      Post-Op Day 1 S/P    Delivered a female infant.  Subjective     Patient reported    Pain is well controlled. Voiding and ambulating without difficulty. Tolerating po. Lochia normal.       The patient plans to breastfeed.         Objective       Vitals: Vital Signs Range for the last 24 hours  Temperature: Temp:  [97 °F (36.1 °C)-98.2 °F (36.8 °C)] 98.2 °F (36.8 °C)   Temp Source: Temp src: Oral   BP: BP: ()/(38-87) 111/62   Pulse: Heart Rate:  [55-94] 69   Respirations: Resp:  [16-18] 18         Intake/Output Summary (Last 24 hours) at 11/15/17 0800  Last data filed at 11/15/17 0510   Gross per 24 hour   Intake             2936 ml   Output             3900 ml   Net             -964 ml                                              Physical Exam     General Alert and awake, in NAD      CV Regular rate and rhythm      Lungs clear to auscultation bilaterally        Abdomen Soft, non-distended, fundus firm,  2 below umbilicus, appropriately tender      Incision  Dressing clean, dry and intact.      Extremities  tr edema, calves NT               LABS: Lab Results   Component Value Date    WBC 8.26 2017    HGB 12.7 2017    HCT 37.6 2017    MCV 98.2 2017     2017     Prenatal labs results reviewed:  Yes   Rubella:  immune  Rh Status:    RH type   Date Value Ref Range Status   2017 Positive  Final                       Assessment/Plan   : 1. POD 1 S/P C/S: Hemodynamically stable.  Doing well.  Continue routine care.             Kirstie Strange MD  11/15/2017  8:00 AM

## 2017-11-15 NOTE — LACTATION NOTE
This note was copied from a baby's chart.  P2 term LGA, she is BF well per Mom and has deep latch now in cradle hold. Mom is AMA, she BF her first without problems, encouraged insurance pumping if baby is having low blood sugars. She has personal pump at home that will be brought to hospital and if not a hand pump will be given.

## 2017-11-15 NOTE — PLAN OF CARE
Problem: Fall Risk  (Adult,Obstetrics,Pediatric)  Intervention: Monitor/Assist with Self Care    11/15/17 0911   Activity   Activity Type activity adjusted per tolerance;activity encouraged;ambulated to bathroom;sitting, edge of bed;up ad holly;up in chair   Activity Level of Assistance assistance, 1 person;independent;other (see comments)   Musculoskeletal Interventions   Self-Care Promotion independence encouraged         Goal: Identify Related Risk Factors and Signs and Symptoms  Outcome: Ongoing (interventions implemented as appropriate)  post epidural anesthesia  Goal: Absence of Maternal Fall  Outcome: Ongoing (interventions implemented as appropriate)  Goal: Absence of [place] Fall/Drop  Outcome: Ongoing (interventions implemented as appropriate)    Problem: Breastfeeding (Adult,NICU,,Obstetrics,Pediatric)  Goal: Signs and Symptoms of Listed Potential Problems Will be Absent or Manageable (Breastfeeding)  Outcome: Ongoing (interventions implemented as appropriate)

## 2017-11-16 LAB
BASOPHILS # BLD MANUAL: 0.07 10*3/MM3 (ref 0–0.2)
BASOPHILS NFR BLD AUTO: 1 % (ref 0–1.5)
DEPRECATED RDW RBC AUTO: 48.3 FL (ref 37–54)
EOSINOPHIL # BLD MANUAL: 0.07 10*3/MM3 (ref 0–0.7)
EOSINOPHIL NFR BLD MANUAL: 1 % (ref 0.3–6.2)
ERYTHROCYTE [DISTWIDTH] IN BLOOD BY AUTOMATED COUNT: 13.3 % (ref 11.7–13)
HCT VFR BLD AUTO: 27.2 % (ref 35.6–45.5)
HGB BLD-MCNC: 9.2 G/DL (ref 11.9–15.5)
LYMPHOCYTES # BLD MANUAL: 1.38 10*3/MM3 (ref 0.9–4.8)
LYMPHOCYTES NFR BLD MANUAL: 20 % (ref 19.6–45.3)
LYMPHOCYTES NFR BLD MANUAL: 5 % (ref 5–12)
MCH RBC QN AUTO: 33.6 PG (ref 26.9–32)
MCHC RBC AUTO-ENTMCNC: 33.8 G/DL (ref 32.4–36.3)
MCV RBC AUTO: 99.3 FL (ref 80.5–98.2)
MONOCYTES # BLD AUTO: 0.35 10*3/MM3 (ref 0.2–1.2)
NEUTROPHILS # BLD AUTO: 5.04 10*3/MM3 (ref 1.9–8.1)
NEUTROPHILS NFR BLD MANUAL: 73 % (ref 42.7–76)
PLAT MORPH BLD: NORMAL
PLATELET # BLD AUTO: 151 10*3/MM3 (ref 140–500)
PMV BLD AUTO: 11.9 FL (ref 6–12)
RBC # BLD AUTO: 2.74 10*6/MM3 (ref 3.9–5.2)
RBC MORPH BLD: NORMAL
WBC MORPH BLD: NORMAL
WBC NRBC COR # BLD: 6.91 10*3/MM3 (ref 4.5–10.7)

## 2017-11-16 PROCEDURE — 85007 BL SMEAR W/DIFF WBC COUNT: CPT | Performed by: OBSTETRICS & GYNECOLOGY

## 2017-11-16 PROCEDURE — 85027 COMPLETE CBC AUTOMATED: CPT | Performed by: OBSTETRICS & GYNECOLOGY

## 2017-11-16 RX ADMIN — DOCUSATE SODIUM 100 MG: 100 CAPSULE, LIQUID FILLED ORAL at 08:41

## 2017-11-16 RX ADMIN — IBUPROFEN 800 MG: 800 TABLET ORAL at 08:41

## 2017-11-16 RX ADMIN — OXYCODONE HYDROCHLORIDE AND ACETAMINOPHEN 2 TABLET: 5; 325 TABLET ORAL at 03:54

## 2017-11-16 RX ADMIN — SIMETHICONE CHEW TAB 80 MG 80 MG: 80 TABLET ORAL at 08:41

## 2017-11-16 RX ADMIN — IBUPROFEN 800 MG: 800 TABLET ORAL at 17:12

## 2017-11-16 RX ADMIN — OXYCODONE HYDROCHLORIDE AND ACETAMINOPHEN 2 TABLET: 5; 325 TABLET ORAL at 21:13

## 2017-11-16 RX ADMIN — OXYCODONE HYDROCHLORIDE AND ACETAMINOPHEN 2 TABLET: 5; 325 TABLET ORAL at 17:12

## 2017-11-16 RX ADMIN — ZOLPIDEM TARTRATE 5 MG: 5 TABLET ORAL at 21:58

## 2017-11-16 RX ADMIN — OXYCODONE HYDROCHLORIDE AND ACETAMINOPHEN 2 TABLET: 5; 325 TABLET ORAL at 13:35

## 2017-11-16 RX ADMIN — OXYCODONE HYDROCHLORIDE AND ACETAMINOPHEN 2 TABLET: 5; 325 TABLET ORAL at 08:41

## 2017-11-16 RX ADMIN — DOCUSATE SODIUM 100 MG: 100 CAPSULE, LIQUID FILLED ORAL at 17:12

## 2017-11-16 RX ADMIN — SIMETHICONE CHEW TAB 80 MG 80 MG: 80 TABLET ORAL at 17:12

## 2017-11-16 NOTE — PLAN OF CARE
Problem: Patient Care Overview (Adult)  Goal: Plan of Care Review  Outcome: Ongoing (interventions implemented as appropriate)    17   Coping/Psychosocial Response Interventions   Plan Of Care Reviewed With patient   Patient Care Overview   Progress improving   Outcome Evaluation   Outcome Summary/Follow up Plan Vitals, bleeding, fundus, and incision all WNL; fell in AM, advised to call for assistance with ambulation; breastfeeding gabi well and pain under control with po meds       Goal: Adult Individualization and Mutuality  Outcome: Ongoing (interventions implemented as appropriate)    17   Individualization   Patient Specific Preferences Breastfeeding   Patient Specific Goals Pain management       Goal: Discharge Needs Assessment  Outcome: Ongoing (interventions implemented as appropriate)    17   Discharge Needs Assessment   Concerns To Be Addressed no discharge needs identified   Readmission Within The Last 30 Days no previous admission in last 30 days   Discharge Disposition home or self-care         Problem: Fall Risk  (Adult,Obstetrics,Pediatric)  Intervention: Review Medications/Identify Contributors to Fall Risk    17   Safety Interventions   Medication Review/Management medications reviewed         Goal: Identify Related Risk Factors and Signs and Symptoms  Outcome: Ongoing (interventions implemented as appropriate)    17   Fall Risk    Fall Risk: Related Risk Factors hypotension;medication side effects;prolonged bed rest;significant blood loss    Fall Risk: Signs and Symptoms presence of fall risk factors;increased risk of  fall/drop       Goal: Absence of Maternal Fall  Outcome: Ongoing (interventions implemented as appropriate)    17   Fall Risk  (Adult,Obstetrics,Pediatric)   Absence of Maternal Fall unable to achieve outcome       Goal: Absence of [place] Fall/Drop  Outcome: Ongoing  (interventions implemented as appropriate)    17   Fall Risk  (Adult,Obstetrics,Pediatric)   Absence of  Fall/Drop making progress toward outcome         Problem: Postpartum ( Delivery) (Adult)  Goal: Signs and Symptoms of Listed Potential Problems Will be Absent or Manageable (Postpartum)  Outcome: Ongoing (interventions implemented as appropriate)    17   Postpartum ( Delivery)   Problems Assessed (Postpartum ) all   Problems Present (Postpartum ) pain         Problem: Breastfeeding (Adult,NICU,[place],Obstetrics,Pediatric)  Goal: Signs and Symptoms of Listed Potential Problems Will be Absent or Manageable (Breastfeeding)  Outcome: Ongoing (interventions implemented as appropriate)    17   Breastfeeding   Problems Assessed (Breastfeeding) all   Problems Present (Breastfeeding) none

## 2017-11-16 NOTE — PROGRESS NOTES
[facility]   PROGRESS NOTE    Patient Name: Sil Ly  :  1975  MRN:  [id number]      Post-Op 2 S/P   Subjective     Patient reports:   Doing well. Pain well controlled. Tolerating regular diet and having flatus.    Lochia normal.       Objective       Vitals: Vital Signs Range for the last 24 hours  Temperature: Temp:  [97.9 °F (36.6 °C)-98.5 °F (36.9 °C)] 97.9 °F (36.6 °C)       BP: BP: ()/(56-59) 95/59   Pulse: Heart Rate:  [55-80] 55   Respirations: Resp:  [16-20] 16                                         Physical Exam     General Alert       Abdomen Soft, non-distended, fundus firm,  below umbilicus, appropriately tender      Incision  Intact, no erythema or exudate      Extremities Calves NT bilaterally                Assessment/Plan  :   POD 2  -  Doing well.  Continue usual cares.         Active Problems:    Pregnancy               CANDICE Bobby  2017  8:30 AM    Patient seen and examined. Agree with above assessment.   Georgina Ventura MD  2017  2:21 PM

## 2017-11-17 VITALS
RESPIRATION RATE: 16 BRPM | WEIGHT: 186 LBS | HEIGHT: 67 IN | HEART RATE: 70 BPM | TEMPERATURE: 98.6 F | OXYGEN SATURATION: 98 % | DIASTOLIC BLOOD PRESSURE: 59 MMHG | BODY MASS INDEX: 29.19 KG/M2 | SYSTOLIC BLOOD PRESSURE: 110 MMHG

## 2017-11-17 RX ORDER — IBUPROFEN 800 MG/1
800 TABLET ORAL EVERY 8 HOURS PRN
Qty: 39 TABLET | Refills: 0 | Status: SHIPPED | OUTPATIENT
Start: 2017-11-17 | End: 2019-01-30

## 2017-11-17 RX ORDER — OXYCODONE HYDROCHLORIDE AND ACETAMINOPHEN 5; 325 MG/1; MG/1
1-2 TABLET ORAL EVERY 6 HOURS PRN
Qty: 20 TABLET | Refills: 0 | Status: SHIPPED | OUTPATIENT
Start: 2017-11-17 | End: 2017-11-24

## 2017-11-17 RX ADMIN — OXYCODONE HYDROCHLORIDE AND ACETAMINOPHEN 1 TABLET: 5; 325 TABLET ORAL at 01:31

## 2017-11-17 RX ADMIN — OXYCODONE HYDROCHLORIDE AND ACETAMINOPHEN 2 TABLET: 5; 325 TABLET ORAL at 13:59

## 2017-11-17 RX ADMIN — OXYCODONE HYDROCHLORIDE AND ACETAMINOPHEN 2 TABLET: 5; 325 TABLET ORAL at 09:55

## 2017-11-17 RX ADMIN — IBUPROFEN 800 MG: 800 TABLET ORAL at 01:31

## 2017-11-17 RX ADMIN — IBUPROFEN 800 MG: 800 TABLET ORAL at 09:55

## 2017-11-17 RX ADMIN — OXYCODONE HYDROCHLORIDE AND ACETAMINOPHEN 2 TABLET: 5; 325 TABLET ORAL at 05:37

## 2017-11-17 NOTE — LACTATION NOTE
This note was copied from a baby's chart.  BF going well per Mom. D/c today, baby's weight is wnl. Encouraged to call for any assist. Given [facility] card for f/u as needed.

## 2017-11-17 NOTE — DISCHARGE SUMMARY
[facility]   PROGRESS NOTE    Patient Name: Sil Ly  :  1975  MRN:  [id number]      Post-Op Day 3 S/P   Subjective     Patient reports:    Doing well - ready for discharge. Pain well controlled. Tolerating po and   having flatus. Voiding and ambulating without difficulty. Lochia normal. Wants to go home    Objective       Vitals: Vital Signs Range for the last 24 hours  Temperature: Temp:  [97.6 °F (36.4 °C)-98.6 °F (37 °C)] 98.6 °F (37 °C)       BP: BP: (104-110)/(58-63) 110/59   Pulse: Heart Rate:  [63-70] 70                                                         Physical  Exam     Gen: Alert and awake  Heart- RRR  Lungs- CTAB    Abdomen: Soft, ND,  Fundus firm with minimal tenderness    Incision : Intact, without erythema or exudate    Extremities: Calves NT bilaterally               Assessment/Plan ]   Assessment:  POD 3  -  Doing well. Stable for discharge.     Plan:    Instructions reviewed -  Follow up in 6 weeks.   Rx on chart.       Active Problems:    Pregnancy            Mireille Bourne MD  2017  8:10 AM

## 2017-11-17 NOTE — PLAN OF CARE
Problem: Patient Care Overview (Adult)  Goal: Plan of Care Review  Outcome: Ongoing (interventions implemented as appropriate)    17 1030   Coping/Psychosocial Response Interventions   Plan Of Care Reviewed With patient   Patient Care Overview   Progress progress toward functional goals as expected   Outcome Evaluation   Outcome Summary/Follow up Plan plan for discharge today       Goal: Adult Individualization and Mutuality  Outcome: Ongoing (interventions implemented as appropriate)  Goal: Discharge Needs Assessment  Outcome: Ongoing (interventions implemented as appropriate)    17 1030   Discharge Needs Assessment   Concerns To Be Addressed no discharge needs identified   Readmission Within The Last 30 Days no previous admission in last 30 days   Equipment Needed After Discharge none   Discharge Disposition home or self-care   Current Health   Anticipated Changes Related to Illness none   Self-Care   Equipment Currently Used at Home none   Living Environment   Transportation Available car         Problem: Postpartum ( Delivery) (Adult)  Goal: Signs and Symptoms of Listed Potential Problems Will be Absent or Manageable (Postpartum)  Outcome: Ongoing (interventions implemented as appropriate)    17 1030   Postpartum ( Delivery)   Problems Assessed (Postpartum ) all   Problems Present (Postpartum ) pain         Problem: Breastfeeding (Adult,NICU,,Obstetrics,Pediatric)  Goal: Signs and Symptoms of Listed Potential Problems Will be Absent or Manageable (Breastfeeding)  Outcome: Ongoing (interventions implemented as appropriate)    17 1030   Breastfeeding   Problems Assessed (Breastfeeding) all   Problems Present (Breastfeeding) none

## 2017-11-17 NOTE — PLAN OF CARE
Problem: Patient Care Overview (Adult)  Goal: Plan of Care Review  Outcome: Ongoing (interventions implemented as appropriate)    17 0552   Coping/Psychosocial Response Interventions   Plan Of Care Reviewed With patient   Patient Care Overview   Progress improving   Outcome Evaluation   Outcome Summary/Follow up Plan Doing well, pain controlled w po meds, bst feeding well       Goal: Adult Individualization and Mutuality  Outcome: Ongoing (interventions implemented as appropriate)  Goal: Discharge Needs Assessment  Outcome: Ongoing (interventions implemented as appropriate)    Problem: Fall Risk  (Adult,Obstetrics,Pediatric)  Goal: Identify Related Risk Factors and Signs and Symptoms  Outcome: Ongoing (interventions implemented as appropriate)  Goal: Absence of Maternal Fall  Outcome: Ongoing (interventions implemented as appropriate)  Goal: Absence of Marshall Fall/Drop  Outcome: Ongoing (interventions implemented as appropriate)    Problem: Postpartum ( Delivery) (Adult)  Goal: Signs and Symptoms of Listed Potential Problems Will be Absent or Manageable (Postpartum)  Outcome: Ongoing (interventions implemented as appropriate)    Problem: Breastfeeding (Adult,NICU,M[place],Obstetrics,Pediatric)  Goal: Signs and Symptoms of Listed Potential Problems Will be Absent or Manageable (Breastfeeding)  Outcome: Ongoing (interventions implemented as appropriate)

## 2018-12-13 ENCOUNTER — APPOINTMENT (OUTPATIENT)
Dept: GENERAL RADIOLOGY | Facility: HOSPITAL | Age: 43
End: 2018-12-13

## 2018-12-13 ENCOUNTER — HOSPITAL ENCOUNTER (EMERGENCY)
Facility: HOSPITAL | Age: 43
Discharge: HOME | End: 2018-12-13
Attending: EMERGENCY MEDICINE | Admitting: EMERGENCY MEDICINE

## 2018-12-13 VITALS
HEIGHT: 68 IN | TEMPERATURE: 98.2 F | HEART RATE: 83 BPM | RESPIRATION RATE: 16 BRPM | DIASTOLIC BLOOD PRESSURE: 84 MMHG | BODY MASS INDEX: 25.01 KG/M2 | WEIGHT: 165 LBS | SYSTOLIC BLOOD PRESSURE: 129 MMHG | OXYGEN SATURATION: 97 %

## 2018-12-13 DIAGNOSIS — A08.11 NOROVIRUS: ICD-10-CM

## 2018-12-13 DIAGNOSIS — B34.2 CORONAVIRUS INFECTION: Primary | ICD-10-CM

## 2018-12-13 DIAGNOSIS — A04.0 INTESTINAL INFECTION DUE TO ENTEROPATHOGENIC E. COLI: ICD-10-CM

## 2018-12-13 LAB
ADV 40+41 DNA STL QL NAA+NON-PROBE: NOT DETECTED
ALBUMIN SERPL-MCNC: 4.6 G/DL (ref 3.5–5.2)
ALBUMIN/GLOB SERPL: 1.3 G/DL
ALP SERPL-CCNC: 111 U/L (ref 39–117)
ALT SERPL W P-5'-P-CCNC: 11 U/L (ref 1–33)
ANION GAP SERPL CALCULATED.3IONS-SCNC: 16.6 MMOL/L
AST SERPL-CCNC: 14 U/L (ref 1–32)
ASTRO TYP 1-8 RNA STL QL NAA+NON-PROBE: NOT DETECTED
B PARAPERT DNA SPEC QL NAA+PROBE: NOT DETECTED
B PERT DNA SPEC QL NAA+PROBE: NOT DETECTED
BASOPHILS # BLD AUTO: 0.01 10*3/MM3 (ref 0–0.2)
BASOPHILS NFR BLD AUTO: 0.1 % (ref 0–1.5)
BILIRUB SERPL-MCNC: 0.8 MG/DL (ref 0.1–1.2)
BILIRUB UR QL STRIP: NEGATIVE
BUN BLD-MCNC: 20 MG/DL (ref 6–20)
BUN/CREAT SERPL: 26 (ref 7–25)
C CAYETANENSIS DNA STL QL NAA+NON-PROBE: NOT DETECTED
C PNEUM DNA NPH QL NAA+NON-PROBE: NOT DETECTED
CALCIUM SPEC-SCNC: 9.6 MG/DL (ref 8.6–10.5)
CAMPY SP DNA.DIARRHEA STL QL NAA+PROBE: NOT DETECTED
CHLORIDE SERPL-SCNC: 106 MMOL/L (ref 98–107)
CLARITY UR: CLEAR
CO2 SERPL-SCNC: 21.4 MMOL/L (ref 22–29)
COLOR UR: YELLOW
CREAT BLD-MCNC: 0.77 MG/DL (ref 0.57–1)
CRYPTOSP STL CULT: NOT DETECTED
D-LACTATE SERPL-SCNC: 1.9 MMOL/L (ref 0.5–2)
DEPRECATED RDW RBC AUTO: 48.2 FL (ref 37–54)
E COLI DNA SPEC QL NAA+PROBE: NOT DETECTED
E HISTOLYT AG STL-ACNC: NOT DETECTED
EAEC PAA PLAS AGGR+AATA ST NAA+NON-PRB: DETECTED
EC STX1 + STX2 GENES STL NAA+PROBE: NOT DETECTED
EOSINOPHIL # BLD AUTO: 0.18 10*3/MM3 (ref 0–0.7)
EOSINOPHIL NFR BLD AUTO: 1.6 % (ref 0.3–6.2)
EPEC EAE GENE STL QL NAA+NON-PROBE: NOT DETECTED
ERYTHROCYTE [DISTWIDTH] IN BLOOD BY AUTOMATED COUNT: 14.3 % (ref 11.7–13)
ETEC LTA+ST1A+ST1B TOX ST NAA+NON-PROBE: NOT DETECTED
FLUAV H1 2009 PAND RNA NPH QL NAA+PROBE: NOT DETECTED
FLUAV H1 HA GENE NPH QL NAA+PROBE: NOT DETECTED
FLUAV H3 RNA NPH QL NAA+PROBE: NOT DETECTED
FLUAV SUBTYP SPEC NAA+PROBE: NOT DETECTED
FLUBV RNA ISLT QL NAA+PROBE: NOT DETECTED
G LAMBLIA DNA SPEC QL NAA+PROBE: NOT DETECTED
GFR SERPL CREATININE-BSD FRML MDRD: 82 ML/MIN/1.73
GLOBULIN UR ELPH-MCNC: 3.6 GM/DL
GLUCOSE BLD-MCNC: 154 MG/DL (ref 65–99)
GLUCOSE UR STRIP-MCNC: NEGATIVE MG/DL
HADV DNA SPEC NAA+PROBE: NOT DETECTED
HCG SERPL QL: NEGATIVE
HCOV 229E RNA SPEC QL NAA+PROBE: NOT DETECTED
HCOV HKU1 RNA SPEC QL NAA+PROBE: NOT DETECTED
HCOV NL63 RNA SPEC QL NAA+PROBE: NOT DETECTED
HCOV OC43 RNA SPEC QL NAA+PROBE: DETECTED
HCT VFR BLD AUTO: 43.6 % (ref 35.6–45.5)
HGB BLD-MCNC: 14.9 G/DL (ref 11.9–15.5)
HGB UR QL STRIP.AUTO: NEGATIVE
HMPV RNA NPH QL NAA+NON-PROBE: NOT DETECTED
HOLD SPECIMEN: NORMAL
HOLD SPECIMEN: NORMAL
HPIV1 RNA SPEC QL NAA+PROBE: NOT DETECTED
HPIV2 RNA SPEC QL NAA+PROBE: NOT DETECTED
HPIV3 RNA NPH QL NAA+PROBE: NOT DETECTED
HPIV4 P GENE NPH QL NAA+PROBE: NOT DETECTED
IMM GRANULOCYTES # BLD: 0.02 10*3/MM3 (ref 0–0.03)
IMM GRANULOCYTES NFR BLD: 0.2 % (ref 0–0.5)
KETONES UR QL STRIP: NEGATIVE
LEUKOCYTE ESTERASE UR QL STRIP.AUTO: NEGATIVE
LIPASE SERPL-CCNC: 37 U/L (ref 13–60)
LYMPHOCYTES # BLD AUTO: 0.48 10*3/MM3 (ref 0.9–4.8)
LYMPHOCYTES NFR BLD AUTO: 4.3 % (ref 19.6–45.3)
M PNEUMO IGG SER IA-ACNC: NOT DETECTED
MCH RBC QN AUTO: 31.8 PG (ref 26.9–32)
MCHC RBC AUTO-ENTMCNC: 34.2 G/DL (ref 32.4–36.3)
MCV RBC AUTO: 93.2 FL (ref 80.5–98.2)
MONOCYTES # BLD AUTO: 0.51 10*3/MM3 (ref 0.2–1.2)
MONOCYTES NFR BLD AUTO: 4.6 % (ref 5–12)
NEUTROPHILS # BLD AUTO: 9.97 10*3/MM3 (ref 1.9–8.1)
NEUTROPHILS NFR BLD AUTO: 89.4 % (ref 42.7–76)
NITRITE UR QL STRIP: NEGATIVE
NOROVIRUS GI+II RNA STL QL NAA+NON-PROBE: DETECTED
P SHIGELLOIDES DNA STL QL NAA+NON-PROBE: NOT DETECTED
PH UR STRIP.AUTO: 5.5 [PH] (ref 5–8)
PLATELET # BLD AUTO: 294 10*3/MM3 (ref 140–500)
PMV BLD AUTO: 11.5 FL (ref 6–12)
POTASSIUM BLD-SCNC: 4 MMOL/L (ref 3.5–5.2)
PROCALCITONIN SERPL-MCNC: 0.16 NG/ML (ref 0.1–0.25)
PROT SERPL-MCNC: 8.2 G/DL (ref 6–8.5)
PROT UR QL STRIP: NEGATIVE
RBC # BLD AUTO: 4.68 10*6/MM3 (ref 3.9–5.2)
RHINOVIRUS RNA SPEC NAA+PROBE: NOT DETECTED
RSV RNA NPH QL NAA+NON-PROBE: NOT DETECTED
RV RNA STL NAA+PROBE: NOT DETECTED
SALMONELLA DNA SPEC QL NAA+PROBE: NOT DETECTED
SAPO I+II+IV+V RNA STL QL NAA+NON-PROBE: NOT DETECTED
SHIGELLA SP+EIEC IPAH STL QL NAA+PROBE: NOT DETECTED
SODIUM BLD-SCNC: 144 MMOL/L (ref 136–145)
SP GR UR STRIP: 1.03 (ref 1–1.03)
UROBILINOGEN UR QL STRIP: NORMAL
V CHOLERAE DNA SPEC QL NAA+PROBE: NOT DETECTED
VIBRIO DNA SPEC NAA+PROBE: NOT DETECTED
WBC NRBC COR # BLD: 11.15 10*3/MM3 (ref 4.5–10.7)
WHOLE BLOOD HOLD SPECIMEN: NORMAL
WHOLE BLOOD HOLD SPECIMEN: NORMAL
YERSINIA STL CULT: NOT DETECTED

## 2018-12-13 PROCEDURE — 87581 M.PNEUMON DNA AMP PROBE: CPT | Performed by: EMERGENCY MEDICINE

## 2018-12-13 PROCEDURE — 25010000002 ONDANSETRON PER 1 MG: Performed by: EMERGENCY MEDICINE

## 2018-12-13 PROCEDURE — 87633 RESP VIRUS 12-25 TARGETS: CPT | Performed by: EMERGENCY MEDICINE

## 2018-12-13 PROCEDURE — 25010000002 KETOROLAC TROMETHAMINE PER 15 MG: Performed by: EMERGENCY MEDICINE

## 2018-12-13 PROCEDURE — 99284 EMERGENCY DEPT VISIT MOD MDM: CPT

## 2018-12-13 PROCEDURE — 96374 THER/PROPH/DIAG INJ IV PUSH: CPT

## 2018-12-13 PROCEDURE — 83690 ASSAY OF LIPASE: CPT | Performed by: EMERGENCY MEDICINE

## 2018-12-13 PROCEDURE — 74022 RADEX COMPL AQT ABD SERIES: CPT

## 2018-12-13 PROCEDURE — 96361 HYDRATE IV INFUSION ADD-ON: CPT

## 2018-12-13 PROCEDURE — 80053 COMPREHEN METABOLIC PANEL: CPT | Performed by: EMERGENCY MEDICINE

## 2018-12-13 PROCEDURE — 81003 URINALYSIS AUTO W/O SCOPE: CPT | Performed by: EMERGENCY MEDICINE

## 2018-12-13 PROCEDURE — 84145 PROCALCITONIN (PCT): CPT | Performed by: EMERGENCY MEDICINE

## 2018-12-13 PROCEDURE — 85025 COMPLETE CBC W/AUTO DIFF WBC: CPT | Performed by: EMERGENCY MEDICINE

## 2018-12-13 PROCEDURE — 84703 CHORIONIC GONADOTROPIN ASSAY: CPT | Performed by: EMERGENCY MEDICINE

## 2018-12-13 PROCEDURE — 36415 COLL VENOUS BLD VENIPUNCTURE: CPT

## 2018-12-13 PROCEDURE — 83605 ASSAY OF LACTIC ACID: CPT | Performed by: EMERGENCY MEDICINE

## 2018-12-13 PROCEDURE — 87040 BLOOD CULTURE FOR BACTERIA: CPT | Performed by: EMERGENCY MEDICINE

## 2018-12-13 PROCEDURE — 87507 IADNA-DNA/RNA PROBE TQ 12-25: CPT | Performed by: EMERGENCY MEDICINE

## 2018-12-13 PROCEDURE — 96375 TX/PRO/DX INJ NEW DRUG ADDON: CPT

## 2018-12-13 PROCEDURE — 87486 CHLMYD PNEUM DNA AMP PROBE: CPT | Performed by: EMERGENCY MEDICINE

## 2018-12-13 PROCEDURE — 87798 DETECT AGENT NOS DNA AMP: CPT | Performed by: EMERGENCY MEDICINE

## 2018-12-13 RX ORDER — SODIUM CHLORIDE 0.9 % (FLUSH) 0.9 %
10 SYRINGE (ML) INJECTION AS NEEDED
Status: DISCONTINUED | OUTPATIENT
Start: 2018-12-13 | End: 2018-12-13 | Stop reason: HOSPADM

## 2018-12-13 RX ORDER — ONDANSETRON 4 MG/1
4 TABLET, ORALLY DISINTEGRATING ORAL EVERY 8 HOURS PRN
Qty: 20 TABLET | Refills: 0 | Status: SHIPPED | OUTPATIENT
Start: 2018-12-13 | End: 2019-01-30

## 2018-12-13 RX ORDER — ESCITALOPRAM OXALATE 20 MG/1
20 TABLET ORAL DAILY
COMMUNITY

## 2018-12-13 RX ORDER — ONDANSETRON 2 MG/ML
4 INJECTION INTRAMUSCULAR; INTRAVENOUS ONCE
Status: COMPLETED | OUTPATIENT
Start: 2018-12-13 | End: 2018-12-13

## 2018-12-13 RX ORDER — KETOROLAC TROMETHAMINE 30 MG/ML
30 INJECTION, SOLUTION INTRAMUSCULAR; INTRAVENOUS ONCE
Status: COMPLETED | OUTPATIENT
Start: 2018-12-13 | End: 2018-12-13

## 2018-12-13 RX ADMIN — SODIUM CHLORIDE, POTASSIUM CHLORIDE, SODIUM LACTATE AND CALCIUM CHLORIDE 2244 ML: 600; 310; 30; 20 INJECTION, SOLUTION INTRAVENOUS at 09:05

## 2018-12-13 RX ADMIN — KETOROLAC TROMETHAMINE 30 MG: 30 INJECTION, SOLUTION INTRAMUSCULAR at 09:06

## 2018-12-13 RX ADMIN — ONDANSETRON 4 MG: 2 INJECTION INTRAMUSCULAR; INTRAVENOUS at 09:06

## 2018-12-13 NOTE — ED PROVIDER NOTES
EMERGENCY DEPARTMENT ENCOUNTER    CHIEF COMPLAINT  Chief Complaint: N/V/D  History given by: patient  History limited by: nothing  Room Number: 03/03  PMD: Jenelle Terry MD (Inactive)      HPI:  Pt is a 43 y.o. female who presents complaining of N/V/D that began yesterday. Pt also complains of an intermittent fever and cough for the past three days. Pt also reports a near-syncope episode that happened around 0400 this morning but denies any sustaining injuries. Pt denies blood or mucous in her diarrhea. Pt denies any blood in her vomit. Pt denies abdominal pain. Pt reports that her family has had similar symptoms for the past week. Pt also reports that she had a double hip replacement three weeks ago but denies being on any antibiotics since 11/12/18. There are no other complaints at this time.    Duration: since yesterday  Onset: gradual  Timing: constant  Location: /GI  Radiation: none specified  Quality: N/V/D  Intensity/Severity: moderate  Progression: not specified  Associated Symptoms: intermittent fever, cough, near-syncope  Aggravating Factors: Pt reports that her family has had similar symptoms for the past week.  Alleviating Factors: none specified  Previous Episodes: none specified  Treatment before arrival: none specified    PAST MEDICAL HISTORY  Active Ambulatory Problems     Diagnosis Date Noted   • Pregnancy 11/14/2017     Resolved Ambulatory Problems     Diagnosis Date Noted   • No Resolved Ambulatory Problems     Past Medical History:   Diagnosis Date   • Anxiety        PAST SURGICAL HISTORY  Past Surgical History:   Procedure Laterality Date   • BUNIONECTOMY  12/2013   • WISDOM TOOTH EXTRACTION         FAMILY HISTORY  Family History   Problem Relation Age of Onset   • Melanoma Father    • Hypertension Father        SOCIAL HISTORY  Social History     Socioeconomic History   • Marital status:      Spouse name: Not on file   • Number of children: Not on file   • Years of education:  Not on file   • Highest education level: Not on file   Social Needs   • Financial resource strain: Not on file   • Food insecurity - worry: Not on file   • Food insecurity - inability: Not on file   • Transportation needs - medical: Not on file   • Transportation needs - non-medical: Not on file   Occupational History   • Not on file   Tobacco Use   • Smoking status: Never Smoker   • Smokeless tobacco: Never Used   Substance and Sexual Activity   • Alcohol use: No   • Drug use: No   • Sexual activity: Yes     Partners: Female   Other Topics Concern   • Not on file   Social History Narrative   • Not on file       ALLERGIES  Patient has no known allergies.    REVIEW OF SYSTEMS  Review of Systems   Constitutional: Positive for fever (intermittent).   HENT: Negative for sore throat.    Eyes: Negative.    Respiratory: Positive for cough. Negative for shortness of breath.    Cardiovascular: Negative for chest pain.   Gastrointestinal: Positive for diarrhea, nausea and vomiting. Negative for abdominal pain and blood in stool.   Genitourinary: Negative for dysuria.   Musculoskeletal: Negative for neck pain.   Skin: Negative for rash.   Allergic/Immunologic: Negative.    Neurological: Positive for syncope (near). Negative for weakness, numbness and headaches.   Hematological: Negative.    Psychiatric/Behavioral: Negative.    All other systems reviewed and are negative.      PHYSICAL EXAM  ED Triage Vitals   Temp Heart Rate Resp BP SpO2   12/13/18 0706 12/13/18 0706 12/13/18 0706 12/13/18 0715 12/13/18 0706   98.2 °F (36.8 °C) (!) 137 18 128/89 100 %      Temp src Heart Rate Source Patient Position BP Location FiO2 (%)   12/13/18 0706 -- 12/13/18 0715 12/13/18 0715 --   Tympanic  Sitting Right arm        Physical Exam   Constitutional: She is oriented to person, place, and time. No distress.   HENT:   Head: Normocephalic and atraumatic.   Mouth/Throat: Mucous membranes are dry.   Eyes: EOM are normal. Pupils are equal, round,  and reactive to light.   Pale conjunctiva.   Neck: Normal range of motion. Neck supple.   Cardiovascular: Regular rhythm and normal heart sounds. Tachycardia present. Exam reveals no gallop and no friction rub.   No murmur heard.  HR to 110.   Pulmonary/Chest: Effort normal and breath sounds normal. No respiratory distress. She has no decreased breath sounds. She has no wheezes. She has no rhonchi. She has no rales. She exhibits no tenderness.   Abdominal: Soft. She exhibits no distension and no mass. Bowel sounds are hypoactive. There is no tenderness. There is no rebound and no guarding.   Musculoskeletal: Normal range of motion. She exhibits no edema.   Neurological: She is alert and oriented to person, place, and time. She has normal sensation and normal strength.   Pt has a normal neuro exam.   Skin: Skin is warm and dry. No rash noted.   Psychiatric: Mood and affect normal.   Nursing note and vitals reviewed.      LAB RESULTS  Lab Results (last 24 hours)     Procedure Component Value Units Date/Time    CBC & Differential [[id number]] Collected:  12/13/18 0759    Specimen:  Blood Updated:  12/13/18 0857    Narrative:       The following orders were created for panel order CBC & Differential.  Procedure                               Abnormality         Status                     ---------                               -----------         ------                     CBC Auto Differential[[id number]]        Abnormal            Final result                 Please view results for these tests on the individual orders.    Comprehensive Metabolic Panel [[id number]]  (Abnormal) Collected:  12/13/18 0759    Specimen:  Blood Updated:  12/13/18 0853     Glucose 154 mg/dL      BUN 20 mg/dL      Creatinine 0.77 mg/dL      Sodium 144 mmol/L      Potassium 4.0 mmol/L      Chloride 106 mmol/L      CO2 21.4 mmol/L      Calcium 9.6 mg/dL      Total Protein 8.2 g/dL      Albumin 4.60 g/dL      ALT (SGPT) 11 U/L      AST (SGOT) 14 U/L  "     Alkaline Phosphatase 111 U/L      Total Bilirubin 0.8 mg/dL      eGFR Non African Amer 82 mL/min/1.73      Globulin 3.6 gm/dL      A/G Ratio 1.3 g/dL      BUN/Creatinine Ratio 26.0     Anion Gap 16.6 mmol/L     Lipase [[id number]]  (Normal) Collected:  12/13/18 0759    Specimen:  Blood Updated:  12/13/18 0853     Lipase 37 U/L     Procalcitonin [[id number]]  (Normal) Collected:  12/13/18 0759    Specimen:  Blood Updated:  12/13/18 0900     Procalcitonin 0.16 ng/mL     Narrative:       As a Marker for Sepsis (Non-Neonates):   1. <0.5 ng/mL represents a low risk of severe sepsis and/or septic shock.  1. >2 ng/mL represents a high risk of severe sepsis and/or septic shock.    As a Marker for Lower Respiratory Tract Infections that require antibiotic therapy:  PCT on Admission     Antibiotic Therapy             6-12 Hrs later  > 0.5                Strongly Recommended            >0.25 - <0.5         Recommended  0.1 - 0.25           Discouraged                   Remeasure/reassess PCT  <0.1                 Strongly Discouraged          Remeasure/reassess PCT      As 28 day mortality risk marker: \"Change in Procalcitonin Result\" (> 80 % or <=80 %) if Day 0 (or Day 1) and Day 4 values are available. Refer to http://www.[facility]-pct-calculator.com/   Change in PCT <=80 %   A decrease of PCT levels below or equal to 80 % defines a positive change in PCT test result representing a higher risk for 28-day all-cause mortality of patients diagnosed with severe sepsis or septic shock.  Change in PCT > 80 %   A decrease of PCT levels of more than 80 % defines a negative change in PCT result representing a lower risk for 28-day all-cause mortality of patients diagnosed with severe sepsis or septic shock.                CBC Auto Differential [[id number]]  (Abnormal) Collected:  12/13/18 0759    Specimen:  Blood Updated:  12/13/18 0857     WBC 11.15 10*3/mm3      RBC 4.68 10*6/mm3      Hemoglobin 14.9 g/dL      Hematocrit 43.6 %      " MCV 93.2 fL      MCH 31.8 pg      MCHC 34.2 g/dL      RDW 14.3 %      RDW-SD 48.2 fl      MPV 11.5 fL      Platelets 294 10*3/mm3      Neutrophil % 89.4 %      Lymphocyte % 4.3 %      Monocyte % 4.6 %      Eosinophil % 1.6 %      Basophil % 0.1 %      Immature Grans % 0.2 %      Neutrophils, Absolute 9.97 10*3/mm3      Lymphocytes, Absolute 0.48 10*3/mm3      Monocytes, Absolute 0.51 10*3/mm3      Eosinophils, Absolute 0.18 10*3/mm3      Basophils, Absolute 0.01 10*3/mm3      Immature Grans, Absolute 0.02 10*3/mm3     Urinalysis With Microscopic If Indicated (No Culture) - Urine, Clean Catch [[id number]]  (Normal) Collected:  12/13/18 0852    Specimen:  Urine, Clean Catch Updated:  12/13/18 0916     Color, UA Yellow     Appearance, UA Clear     pH, UA 5.5     Specific Gravity, UA 1.028     Glucose, UA Negative     Ketones, UA Negative     Bilirubin, UA Negative     Blood, UA Negative     Protein, UA Negative     Leuk Esterase, UA Negative     Nitrite, UA Negative     Urobilinogen, UA 0.2 E.U./dL    Narrative:       Urine microscopic not indicated.    Respiratory Panel, PCR - Swab, Nasopharynx [[id number]]  (Abnormal) Collected:  12/13/18 0853    Specimen:  Swab from Nasopharynx Updated:  12/13/18 0953     ADENOVIRUS, PCR Not Detected     Coronavirus 229E Not Detected     Coronavirus HKU1 Not Detected     Coronavirus NL63 Not Detected     Coronavirus OC43 Detected     Human Metapneumovirus Not Detected     Human Rhinovirus/Enterovirus Not Detected     Influenza B PCR Not Detected     Parainfluenza Virus 1 Not Detected     Parainfluenza Virus 2 Not Detected     Parainfluenza Virus 3 Not Detected     Parainfluenza Virus 4 Not Detected     Bordetella pertussis pcr Not Detected     Influenza A H1 2009 PCR Not Detected     Chlamydophila pneumoniae PCR Not Detected     Mycoplasma pneumo by PCR Not Detected     Influenza A PCR Not Detected     Influenza A H3 Not Detected     Influenza A H1 Not Detected     RSV, PCR Not  Detected     Bordetella parapertussis PCR Not Detected    Gastrointestinal Panel, PCR - Stool, Per Rectum [[id number]]  (Abnormal) Collected:  12/13/18 0853    Specimen:  Stool from Per Rectum Updated:  12/13/18 1046     Campylobacter Not Detected     Plesiomonas shigelloides Not Detected     Salmonella Not Detected     Vibrio Not Detected     Vibrio cholerae Not Detected     Yersinia enterocolitica Not Detected     Enteroaggregative E. coli (EAEC) Detected     Enteropathogenic E. coli (EPEC) Not Detected     Enterotoxigenic E. coli (ETEC) lt/st Not Detected     Shiga-like toxin-producing E. coli (STEC) stx1/stx2 Not Detected     E. coli O157 Not Detected     Shigella/Enteroinvasive E. coli (EIEC) Not Detected     Cryptosporidium Not Detected     Cyclospora cayetanensis Not Detected     Entamoeba histolytica Not Detected     Giardia lamblia Not Detected     Adenovirus F40/41 Not Detected     Astrovirus Not Detected     Norovirus GI/GII Detected     Rotavirus A Not Detected     Sapovirus (I, II, IV or V) Not Detected    Narrative:       If Aeromonas, Staphylococcus aureus or Bacillus cereus are suspected, please order [id number]: Stool Culture, Aeromonas, S aureus, B Cereus.    hCG, Serum, Qualitative [[id number]]  (Normal) Collected:  12/13/18 0904    Specimen:  Blood from Arm, Left Updated:  12/13/18 0935     HCG Qualitative Negative    Blood Culture - Blood, Arm, Left [[id number]] Collected:  12/13/18 0904    Specimen:  Blood from Arm, Left Updated:  12/13/18 0920    Lactic Acid, Plasma [[id number]]  (Normal) Collected:  12/13/18 0904    Specimen:  Blood from Arm, Left Updated:  12/13/18 0944     Lactate 1.9 mmol/L     Blood Culture - Blood, Arm, Left [[id number]] Collected:  12/13/18 0914    Specimen:  Blood from Arm, Left Updated:  12/13/18 0920          I ordered the above labs and reviewed the results    RADIOLOGY  XR Abdomen 2 View With Chest 1 View   There are no pulmonary airspace consolidations to suggest  pneumonia. No effusions are seen and there is no evidence for CHF. There are air-filled nondilated loops of small and large bowel. There is no evidence for bowel ileus or obstruction. Bilateral hip arthroplasties are noted.           I ordered the above noted radiological studies. Interpreted by radiologist. Reviewed by me in PACS.       PROCEDURES  Procedures      PROGRESS AND CONSULTS  0833 Ordered Zofran for nausea and LR for hydration. Also ordered blood work, blood cultures, UA, and Abdomen XR with Chest View for further evaluation.    0845 Ordered Toradol for pain.    1104 The patient is ambulating in the ED without difficulty.    1116 Rechecked the patient who is resting comfortably and in NAD. She reports that her symptoms have improved since receiving medication in the ED. Her vital signs are stable. Informed the patient about her lab results that show Coronavirus, E. Coli, and Norovirus. Informed the patient of her unremarkable Abdomen with Chest View XR. Discussed the plan for discharge with a prescription for Zofran. Advised the patient to f/u with her PCP for further evaluation and management. Pt understands and agrees with the plan, all questions answered.      MEDICAL DECISION MAKING  Results were reviewed/discussed with the patient and they were also made aware of online access. Pt also made aware that some labs, such as cultures, will not be resulted during ER visit and follow up with PMD is necessary.     MDM  Number of Diagnoses or Management Options     Amount and/or Complexity of Data Reviewed  Clinical lab tests: ordered and reviewed (E.coli (in stool) Norovirus (in stool) and Coronavirus: Detected. Procalcitonin: 0.16 and Lactic Acid: 1.9)  Tests in the radiology section of CPT®: ordered and reviewed (Abdomen XR with Chest View shows no pulmonary airspace consolidations to suggest pneumonia. No effusions are seen and there is no evidence for CHF. There are air-filled nondilated loops of small  and large bowel. There is no evidence for bowel ileus or obstruction. Bilateral hip arthroplasties are noted.)  Decide to obtain previous medical records or to obtain history from someone other than the patient: yes  Review and summarize past medical records: yes (The patient has no recent pertinent ED visits in [facility].)  Independent visualization of images, tracings, or specimens: yes    Patient Progress  Patient progress: stable         DIAGNOSIS  Final diagnoses:   Coronavirus infection   Norovirus   Intestinal infection due to enteropathogenic E. coli       DISPOSITION  DISCHARGE    Patient discharged in stable condition.    Reviewed implications of results, diagnosis, meds, responsibility to follow up, warning signs and symptoms of possible worsening, potential complications and reasons to return to ER, including any new or worsening symptoms.    Patient/Family voiced understanding of above instructions.    Discussed plan for discharge, as there is no emergent indication for admission. Patient referred to primary care provider for BP management due to today's BP. Pt/family is agreeable and understands need for follow up and repeat testing.  Pt is aware that discharge does not mean that nothing is wrong but it indicates no emergency is present that requires admission and they must continue care with follow-up as given below or physician of their choice.     FOLLOW-UP  Jenelle Terry MD  [address] [id number] [phone]    In 3 days  If symptoms worsen         Medication List      New Prescriptions    ondansetron ODT 4 MG disintegrating tablet  Commonly known as:  ZOFRAN ODT  Take 1 tablet by mouth Every 8 (Eight) Hours As Needed for Nausea or   Vomiting.              Latest Documented Vital Signs:  As of 1:23 PM  BP- 129/84 HR- 83 Temp- 98.2 °F (36.8 °C) (Tympanic) O2 sat- 97%    --  Documentation assistance provided by rupinder Noe for Dr. Meme MD.  Information recorded by the  scribe was done at my direction and has been verified and validated by me.     Denae Noe  12/13/18 1138       Arian Valentine MD  12/13/18 1324

## 2018-12-13 NOTE — ED TRIAGE NOTES
Pt complains of fever for 3 days then diarrhea, nausea and vomiting since 1900 last night. Pt states she had a syncopal episode around 0400 this am. Pt denies any pain from fall.

## 2018-12-18 LAB
BACTERIA SPEC AEROBE CULT: NORMAL
BACTERIA SPEC AEROBE CULT: NORMAL

## 2019-01-29 ENCOUNTER — APPOINTMENT (OUTPATIENT)
Dept: WOMENS IMAGING | Facility: HOSPITAL | Age: 44
End: 2019-01-29

## 2019-01-29 PROCEDURE — 77067 SCR MAMMO BI INCL CAD: CPT | Performed by: RADIOLOGY

## 2019-01-29 PROCEDURE — 77063 BREAST TOMOSYNTHESIS BI: CPT | Performed by: RADIOLOGY

## 2019-01-30 ENCOUNTER — OFFICE VISIT (OUTPATIENT)
Dept: FAMILY MEDICINE CLINIC | Facility: CLINIC | Age: 44
End: 2019-01-30

## 2019-01-30 VITALS
SYSTOLIC BLOOD PRESSURE: 96 MMHG | HEART RATE: 78 BPM | DIASTOLIC BLOOD PRESSURE: 66 MMHG | TEMPERATURE: 96.7 F | RESPIRATION RATE: 16 BRPM | OXYGEN SATURATION: 98 % | HEIGHT: 67 IN | BODY MASS INDEX: 26.21 KG/M2 | WEIGHT: 167 LBS

## 2019-01-30 DIAGNOSIS — Z86.19 HISTORY OF VIRAL ILLNESS: Primary | ICD-10-CM

## 2019-01-30 PROBLEM — M16.0 PRIMARY OSTEOARTHRITIS OF BOTH HIPS: Status: ACTIVE | Noted: 2018-08-10

## 2019-01-30 PROBLEM — Z96.643 S/P BILATERAL HIP REPLACEMENTS: Status: ACTIVE | Noted: 2018-11-12

## 2019-01-30 PROBLEM — M25.552 LEFT HIP PAIN: Status: ACTIVE | Noted: 2018-09-07

## 2019-01-30 PROCEDURE — 99213 OFFICE O/P EST LOW 20 MIN: CPT | Performed by: NURSE PRACTITIONER

## 2019-01-30 RX ORDER — LISDEXAMFETAMINE DIMESYLATE 30 MG/1
30 CAPSULE ORAL EVERY MORNING
Refills: 0 | COMMUNITY
Start: 2019-01-18 | End: 2019-10-11

## 2019-01-30 NOTE — PROGRESS NOTES
"Sil Ly is a 43 y.o. female. Pt is a new pt for clinic, former pt of Dr. Terry. She is here for a hospital f/u. States she felt sick and went to ER at [facility]. Pt feels good now.. Says her daughter was diagnosed with Flu, and wonders if she should be tested or take any medication. Pt is recovering from flu and doing better.   Pt follows with psych betty livingston. Pt has Montserrat Rangel for OBGYN.     Assessment/Plan   Problem List Items Addressed This Visit     None      Visit Diagnoses     History of viral illness    -  Primary             No Follow-up on file.  There are no Patient Instructions on file for this visit.    Chief Complaint   Patient presents with   • Follow-up     f/u from [place], [place] and [place] virus      Social History     Tobacco Use   • Smoking status: Never Smoker   • Smokeless tobacco: Never Used   Substance Use Topics   • Alcohol use: Yes     Comment: socially    • Drug use: No       History of Present Illness     The following portions of the patient's history were reviewed and updated as appropriate:PMHroutine: Social history , Allergies, Current Medications, Active Problem List and Health Maintenance    Review of Systems   Constitutional: Negative for activity change, appetite change, chills, diaphoresis, fatigue, fever and unexpected weight change.       Objective   Vitals:    01/30/19 1005   BP: 96/66   Pulse: 78   Resp: 16   Temp: 96.7 °F (35.9 °C)   SpO2: 98%   Weight: 75.8 kg (167 lb)   Height: 170.2 cm (67\")     Body mass index is 26.16 kg/m².  Physical Exam   Constitutional: She is oriented to person, place, and time. She appears well-developed and well-nourished.   HENT:   Head: Normocephalic.   Eyes: EOM are normal. Pupils are equal, round, and reactive to light.   Neck: Normal range of motion.   Cardiovascular: Normal rate.   Pulmonary/Chest: Effort normal and breath sounds normal.   Abdominal: Soft. Bowel sounds are normal.   Musculoskeletal: Normal range of motion. "   Neurological: She is alert and oriented to person, place, and time.   Skin: Skin is warm and dry.   Psychiatric: She has a normal mood and affect. Her behavior is normal. Judgment and thought content normal.   Nursing note and vitals reviewed.    Reviewed Data:  No visits with results within 1 Month(s) from this visit.   Latest known visit with results is:   Admission on 12/13/2018, Discharged on 12/13/2018   Component Date Value Ref Range Status   • Extra Tube 12/13/2018 hold for add-on   Final    Auto resulted   • Extra Tube 12/13/2018 Hold for add-ons.   Final    Auto resulted.   • Extra Tube 12/13/2018 hold for add-on   Final    Auto resulted   • Extra Tube 12/13/2018 Hold for add-ons.   Final    Auto resulted.   • Glucose 12/13/2018 154* 65 - 99 mg/dL Final   • BUN 12/13/2018 20  6 - 20 mg/dL Final   • Creatinine 12/13/2018 0.77  0.57 - 1.00 mg/dL Final   • Sodium 12/13/2018 144  136 - 145 mmol/L Final   • Potassium 12/13/2018 4.0  3.5 - 5.2 mmol/L Final   • Chloride 12/13/2018 106  98 - 107 mmol/L Final   • CO2 12/13/2018 21.4* 22.0 - 29.0 mmol/L Final   • Calcium 12/13/2018 9.6  8.6 - 10.5 mg/dL Final   • Total Protein 12/13/2018 8.2  6.0 - 8.5 g/dL Final   • Albumin 12/13/2018 4.60  3.50 - 5.20 g/dL Final   • ALT (SGPT) 12/13/2018 11  1 - 33 U/L Final   • AST (SGOT) 12/13/2018 14  1 - 32 U/L Final   • Alkaline Phosphatase 12/13/2018 111  39 - 117 U/L Final   • Total Bilirubin 12/13/2018 0.8  0.1 - 1.2 mg/dL Final   • eGFR Non African Amer 12/13/2018 82  >60 mL/min/1.73 Final   • Globulin 12/13/2018 3.6  gm/dL Final   • A/G Ratio 12/13/2018 1.3  g/dL Final   • BUN/Creatinine Ratio 12/13/2018 26.0* 7.0 - 25.0 Final   • Anion Gap 12/13/2018 16.6  mmol/L Final   • Lipase 12/13/2018 37  13 - 60 U/L Final   • HCG Qualitative 12/13/2018 Negative  Negative Final   • Color, UA 12/13/2018 Yellow  Yellow, Straw Final   • Appearance, UA 12/13/2018 Clear  Clear Final   • pH, UA 12/13/2018 5.5  5.0 - 8.0 Final   •  Specific Gravity, UA 12/13/2018 1.028  1.005 - 1.030 Final   • Glucose, UA 12/13/2018 Negative  Negative Final   • Ketones, UA 12/13/2018 Negative  Negative Final   • Bilirubin, UA 12/13/2018 Negative  Negative Final   • Blood, UA 12/13/2018 Negative  Negative Final   • Protein, UA 12/13/2018 Negative  Negative Final   • Leuk Esterase, UA 12/13/2018 Negative  Negative Final   • Nitrite, UA 12/13/2018 Negative  Negative Final   • Urobilinogen, UA 12/13/2018 0.2 E.U./dL  0.2 - 1.0 E.U./dL Final   • Blood Culture 12/13/2018 No growth at 5 days   Final   • Blood Culture 12/13/2018 No growth at 5 days   Final   • Lactate 12/13/2018 1.9  0.5 - 2.0 mmol/L Final   • Procalcitonin 12/13/2018 0.16  0.10 - 0.25 ng/mL Final   • ADENOVIRUS, PCR 12/13/2018 Not Detected  Not Detected Final   • Coronavirus 229E 12/13/2018 Not Detected  Not Detected Final   • Coronavirus HKU1 12/13/2018 Not Detected  Not Detected Final   • Coronavirus NL63 12/13/2018 Not Detected  Not Detected Final   • Coronavirus OC43 12/13/2018 Detected* Not Detected Final   • Human Metapneumovirus 12/13/2018 Not Detected  Not Detected Final   • Human Rhinovirus/Enterovirus 12/13/2018 Not Detected  Not Detected Final   • Influenza B PCR 12/13/2018 Not Detected  Not Detected Final   • Parainfluenza Virus 1 12/13/2018 Not Detected  Not Detected Final   • Parainfluenza Virus 2 12/13/2018 Not Detected  Not Detected Final   • Parainfluenza Virus 3 12/13/2018 Not Detected  Not Detected Final   • Parainfluenza Virus 4 12/13/2018 Not Detected  Not Detected Final   • Bordetella pertussis pcr 12/13/2018 Not Detected  Not Detected Final   • Influenza A H1 2009 PCR 12/13/2018 Not Detected  Not Detected Final   • Chlamydophila pneumoniae PCR 12/13/2018 Not Detected  Not Detected Final   • Mycoplasma pneumo by PCR 12/13/2018 Not Detected  Not Detected Final   • Influenza A PCR 12/13/2018 Not Detected  Not Detected Final   • Influenza A H3 12/13/2018 Not Detected  Not Detected  Final   • Influenza A H1 12/13/2018 Not Detected  Not Detected Final   • RSV, PCR 12/13/2018 Not Detected  Not Detected Final   • Bordetella parapertussis PCR 12/13/2018 Not Detected  Not Detected Final   • Campylobacter 12/13/2018 Not Detected  Not Detected Final   • Plesiomonas shigelloides 12/13/2018 Not Detected  Not Detected Final   • Salmonella 12/13/2018 Not Detected  Not Detected Final   • Vibrio 12/13/2018 Not Detected  Not Detected Final   • Vibrio cholerae 12/13/2018 Not Detected  Not Detected Final   • Yersinia enterocolitica 12/13/2018 Not Detected  Not Detected Final   • Enteroaggregative E. coli (EAEC) 12/13/2018 Detected* Not Detected Final   • Enteropathogenic E. coli (EPEC) 12/13/2018 Not Detected  Not Detected Final   • Enterotoxigenic E. coli (ETEC) lt/* 12/13/2018 Not Detected  Not Detected Final   • Shiga-like toxin-producing E. coli* 12/13/2018 Not Detected  Not Detected Final   • E. coli O157 12/13/2018 Not Detected  Not Detected Final   • Shigella/Enteroinvasive E. coli (E* 12/13/2018 Not Detected  Not Detected Final   • Cryptosporidium 12/13/2018 Not Detected  Not Detected Final   • Cyclospora cayetanensis 12/13/2018 Not Detected  Not Detected Final   • Entamoeba histolytica 12/13/2018 Not Detected  Not Detected Final   • Giardia lamblia 12/13/2018 Not Detected  Not Detected Final   • Adenovirus F40/41 12/13/2018 Not Detected  Not Detected Final   • Astrovirus 12/13/2018 Not Detected  Not Detected Final   • Norovirus GI/GII 12/13/2018 Detected* Not Detected Final   • Rotavirus A 12/13/2018 Not Detected  Not Detected Final   • Sapovirus (I, II, IV or V) 12/13/2018 Not Detected  Not Detected Final   • WBC 12/13/2018 11.15* 4.50 - 10.70 10*3/mm3 Final   • RBC 12/13/2018 4.68  3.90 - 5.20 10*6/mm3 Final   • Hemoglobin 12/13/2018 14.9  11.9 - 15.5 g/dL Final   • Hematocrit 12/13/2018 43.6  35.6 - 45.5 % Final   • MCV 12/13/2018 93.2  80.5 - 98.2 fL Final   • MCH 12/13/2018 31.8  26.9 - 32.0 pg  Final   • MCHC 12/13/2018 34.2  32.4 - 36.3 g/dL Final   • RDW 12/13/2018 14.3* 11.7 - 13.0 % Final   • RDW-SD 12/13/2018 48.2  37.0 - 54.0 fl Final   • MPV 12/13/2018 11.5  6.0 - 12.0 fL Final   • Platelets 12/13/2018 294  140 - 500 10*3/mm3 Final   • Neutrophil % 12/13/2018 89.4* 42.7 - 76.0 % Final   • Lymphocyte % 12/13/2018 4.3* 19.6 - 45.3 % Final   • Monocyte % 12/13/2018 4.6* 5.0 - 12.0 % Final   • Eosinophil % 12/13/2018 1.6  0.3 - 6.2 % Final   • Basophil % 12/13/2018 0.1  0.0 - 1.5 % Final   • Immature Grans % 12/13/2018 0.2  0.0 - 0.5 % Final   • Neutrophils, Absolute 12/13/2018 9.97* 1.90 - 8.10 10*3/mm3 Final   • Lymphocytes, Absolute 12/13/2018 0.48* 0.90 - 4.80 10*3/mm3 Final   • Monocytes, Absolute 12/13/2018 0.51  0.20 - 1.20 10*3/mm3 Final   • Eosinophils, Absolute 12/13/2018 0.18  0.00 - 0.70 10*3/mm3 Final   • Basophils, Absolute 12/13/2018 0.01  0.00 - 0.20 10*3/mm3 Final   • Immature Grans, Absolute 12/13/2018 0.02  0.00 - 0.03 10*3/mm3 Final

## 2019-10-11 ENCOUNTER — OFFICE VISIT (OUTPATIENT)
Dept: FAMILY MEDICINE CLINIC | Facility: CLINIC | Age: 44
End: 2019-10-11

## 2019-10-11 VITALS
BODY MASS INDEX: 25.69 KG/M2 | HEART RATE: 81 BPM | SYSTOLIC BLOOD PRESSURE: 120 MMHG | OXYGEN SATURATION: 98 % | DIASTOLIC BLOOD PRESSURE: 80 MMHG | WEIGHT: 164 LBS

## 2019-10-11 DIAGNOSIS — Z23 NEED FOR VACCINATION: ICD-10-CM

## 2019-10-11 DIAGNOSIS — Z00.00 ANNUAL PHYSICAL EXAM: Primary | ICD-10-CM

## 2019-10-11 PROCEDURE — 90674 CCIIV4 VAC NO PRSV 0.5 ML IM: CPT | Performed by: NURSE PRACTITIONER

## 2019-10-11 PROCEDURE — 99396 PREV VISIT EST AGE 40-64: CPT | Performed by: NURSE PRACTITIONER

## 2019-10-11 PROCEDURE — 90471 IMMUNIZATION ADMIN: CPT | Performed by: NURSE PRACTITIONER

## 2019-10-11 NOTE — PROGRESS NOTES
Subjective   Sil Ly is a 43 y.o. female. She is a former patient of GIO Palomo, but is new to me.     History of Present Illness   Pt presents today for [facility] and wants to establish care.  She has no health concerns.     The following portions of the patient's history were reviewed and updated as appropriate: allergies, current medications, past family history, past medical history, past social history, past surgical history and problem list.    Review of Systems   Constitutional: Negative for appetite change, chills, fatigue, fever, unexpected weight gain and unexpected weight loss.   HENT: Negative for congestion, dental problem, postnasal drip, sinus pressure and sore throat.         Dental exam up to date.    Eyes: Negative.  Negative for blurred vision, double vision and photophobia.   Respiratory: Negative for cough, chest tightness, shortness of breath and wheezing.    Cardiovascular: Negative for chest pain, palpitations and leg swelling.   Gastrointestinal: Negative for abdominal pain, constipation, diarrhea, nausea, vomiting and GERD.   Endocrine: Negative.  Negative for cold intolerance and heat intolerance.   Genitourinary: Negative.  Negative for breast discharge, breast lump, breast pain, dysuria, frequency, vaginal bleeding and vaginal discharge.   Musculoskeletal: Negative for arthralgias, back pain, joint swelling and myalgias.   Skin: Negative.    Allergic/Immunologic: Positive for environmental allergies. Negative for food allergies.        Seasonal allergies   Neurological: Negative for dizziness, weakness, numbness and headache.   Hematological: Negative.    Psychiatric/Behavioral: Positive for sleep disturbance. Negative for depressed mood. The patient is nervous/anxious.        Objective   Physical Exam   Constitutional: She is oriented to person, place, and time. She appears well-developed and well-nourished.   HENT:   Head: Normocephalic and atraumatic.   Right Ear: External ear  normal.   Left Ear: External ear normal.   Nose: Nose normal.   Mouth/Throat: Oropharynx is clear and moist.   Eyes: Conjunctivae and EOM are normal. Pupils are equal, round, and reactive to light.   Neck: Normal range of motion. Neck supple. No thyromegaly present.   Cardiovascular: Normal rate, regular rhythm, normal heart sounds and intact distal pulses.   No murmur heard.  Pulses:       Dorsalis pedis pulses are 2+ on the right side, and 2+ on the left side.        Posterior tibial pulses are 2+ on the right side, and 2+ on the left side.   Pulmonary/Chest: Effort normal and breath sounds normal.   Abdominal: Soft. Bowel sounds are normal. She exhibits no distension. There is no tenderness.   Musculoskeletal: Normal range of motion. She exhibits no edema or deformity.   Lymphadenopathy:     She has no cervical adenopathy.   Neurological: She is alert and oriented to person, place, and time. No cranial nerve deficit.   Reflex Scores:       Patellar reflexes are 2+ on the right side and 2+ on the left side.  Skin: Skin is warm and dry.   Psychiatric: She has a normal mood and affect. Her behavior is normal. Judgment and thought content normal.   Nursing note and vitals reviewed.      Vitals:    10/11/19 1450   BP: 120/80   Pulse: 81   SpO2: 98%     Body mass index is 25.69 kg/m².    Procedures    Assessment/Plan   Problems Addressed this Visit     None      Visit Diagnoses     Annual physical exam    -  Primary    Relevant Orders    CBC (No Diff)    Comprehensive Metabolic Panel    Lipid Panel With / Chol / HDL Ratio    TSH    Need for vaccination        Relevant Orders    Flucelvax Quad (Vial) =>4 yrs (0290-7819)        Follow-up in 1 year for annual exam.    Healthy diet and exercise discussed.     Patient Instructions     I will call you with your lab results.   Please call with any questions or concerns.     Annual Wellness Exam  Personal Prevention Plan of Service     Date of Office Visit:   10/11/2019  Encounter Provider:  GIO Forrester  Place of Service:  [facility]  Patient Name: Sil Ly  :  1975    As part of the Annual Wellness portion of your visit today, we are providing you with this personalized preventive plan of services (PPPS). This plan is based upon recommendations of the [place] Preventive Services Task Force (USPSTF) and the Advisory Committee on Immunization Practices (ACIP).    This lists the preventive care services that should be considered, and provides dates of when you are due. Items listed as completed are up-to-date and do not require any further intervention.    Health Maintenance   Topic Date Due   • TDAP/TD VACCINES (1 - Tdap) 10/20/1994   • INFLUENZA VACCINE  2019   • ANNUAL PHYSICAL  2020   • PAP SMEAR  2022       Orders Placed This Encounter   Procedures   • Flucelvax Quad (Vial) =>4 yrs (3945-9114)   • CBC (No Diff)   • Comprehensive Metabolic Panel   • Lipid Panel With / Chol / HDL Ratio   • TSH       Return in about 1 year (around 10/11/2020) for Annual.

## 2019-10-11 NOTE — PATIENT INSTRUCTIONS
I will call you with your lab results.   Please call with any questions or concerns.     Annual Wellness Exam  Personal Prevention Plan of Service     Date of Office Visit:  10/11/2019  Encounter Provider:  GIO Forrester  Place of Service:  [facility]  Patient Name: Sil Ly  :  1975    As part of the Annual Wellness portion of your visit today, we are providing you with this personalized preventive plan of services (PPPS). This plan is based upon recommendations of the [place] Preventive Services Task Force (USPSTF) and the Advisory Committee on Immunization Practices (ACIP).    This lists the preventive care services that should be considered, and provides dates of when you are due. Items listed as completed are up-to-date and do not require any further intervention.    Health Maintenance   Topic Date Due   • TDAP/TD VACCINES (1 - Tdap) 10/20/1994   • INFLUENZA VACCINE  2019   • ANNUAL PHYSICAL  2020   • PAP SMEAR  2022       Orders Placed This Encounter   Procedures   • Flucelvax Quad (Vial) =>4 yrs ([id number])   • CBC (No Diff)   • Comprehensive Metabolic Panel   • Lipid Panel With / Chol / HDL Ratio   • TSH       Return in about 1 year (around 10/11/2020) for Annual.

## 2019-10-12 LAB
ALBUMIN SERPL-MCNC: 4.4 G/DL (ref 3.5–5.5)
ALBUMIN/GLOB SERPL: 2.3 {RATIO} (ref 1.2–2.2)
ALP SERPL-CCNC: 71 IU/L (ref 39–117)
ALT SERPL-CCNC: 13 IU/L (ref 0–32)
AST SERPL-CCNC: 17 IU/L (ref 0–40)
BILIRUB SERPL-MCNC: 0.7 MG/DL (ref 0–1.2)
BUN SERPL-MCNC: 13 MG/DL (ref 6–24)
BUN/CREAT SERPL: 17 (ref 9–23)
CALCIUM SERPL-MCNC: 9.1 MG/DL (ref 8.7–10.2)
CHLORIDE SERPL-SCNC: 102 MMOL/L (ref 96–106)
CHOLEST SERPL-MCNC: 182 MG/DL (ref 100–199)
CHOLEST/HDLC SERPL: 4 RATIO (ref 0–4.4)
CO2 SERPL-SCNC: 24 MMOL/L (ref 20–29)
CREAT SERPL-MCNC: 0.76 MG/DL (ref 0.57–1)
ERYTHROCYTE [DISTWIDTH] IN BLOOD BY AUTOMATED COUNT: 13.2 % (ref 12.3–15.4)
GLOBULIN SER CALC-MCNC: 1.9 G/DL (ref 1.5–4.5)
GLUCOSE SERPL-MCNC: 159 MG/DL (ref 65–99)
HCT VFR BLD AUTO: 42.8 % (ref 34–46.6)
HDLC SERPL-MCNC: 45 MG/DL
HGB BLD-MCNC: 14.1 G/DL (ref 11.1–15.9)
LDLC SERPL CALC-MCNC: 113 MG/DL (ref 0–99)
MCH RBC QN AUTO: 30.9 PG (ref 26.6–33)
MCHC RBC AUTO-ENTMCNC: 32.9 G/DL (ref 31.5–35.7)
MCV RBC AUTO: 94 FL (ref 79–97)
PLATELET # BLD AUTO: 292 X10E3/UL (ref 150–450)
POTASSIUM SERPL-SCNC: 4.1 MMOL/L (ref 3.5–5.2)
PROT SERPL-MCNC: 6.3 G/DL (ref 6–8.5)
RBC # BLD AUTO: 4.56 X10E6/UL (ref 3.77–5.28)
SODIUM SERPL-SCNC: 140 MMOL/L (ref 134–144)
TRIGL SERPL-MCNC: 122 MG/DL (ref 0–149)
TSH SERPL DL<=0.005 MIU/L-ACNC: 1.21 UIU/ML (ref 0.45–4.5)
VLDLC SERPL CALC-MCNC: 24 MG/DL (ref 5–40)
WBC # BLD AUTO: 5 X10E3/UL (ref 3.4–10.8)

## 2020-01-24 ENCOUNTER — TRANSCRIBE ORDERS (OUTPATIENT)
Dept: ADMINISTRATIVE | Facility: HOSPITAL | Age: 45
End: 2020-01-24

## 2020-01-24 DIAGNOSIS — K46.9 HERNIA OF ABDOMINAL CAVITY: Primary | ICD-10-CM

## 2020-01-28 ENCOUNTER — HOSPITAL ENCOUNTER (OUTPATIENT)
Dept: CT IMAGING | Facility: HOSPITAL | Age: 45
Discharge: HOME | End: 2020-01-28
Admitting: OBSTETRICS & GYNECOLOGY

## 2020-01-28 DIAGNOSIS — K46.9 HERNIA OF ABDOMINAL CAVITY: ICD-10-CM

## 2020-01-28 PROCEDURE — 74177 CT ABD & PELVIS W/CONTRAST: CPT

## 2020-01-28 PROCEDURE — 25010000002 IOPAMIDOL 61 % SOLUTION: Performed by: OBSTETRICS & GYNECOLOGY

## 2020-01-28 PROCEDURE — 0 DIATRIZOATE MEGLUMINE & SODIUM PER 1 ML: Performed by: OBSTETRICS & GYNECOLOGY

## 2020-01-28 RX ADMIN — IOPAMIDOL 85 ML: 612 INJECTION, SOLUTION INTRAVENOUS at 08:28

## 2020-01-28 RX ADMIN — DIATRIZOATE MEGLUMINE AND DIATRIZOATE SODIUM 30 ML: 600; 100 SOLUTION ORAL; RECTAL at 07:00

## 2020-02-06 ENCOUNTER — OFFICE VISIT (OUTPATIENT)
Dept: SURGERY | Facility: CLINIC | Age: 45
End: 2020-02-06

## 2020-02-06 VITALS — WEIGHT: 167 LBS | HEART RATE: 66 BPM | HEIGHT: 68 IN | BODY MASS INDEX: 25.31 KG/M2 | OXYGEN SATURATION: 99 %

## 2020-02-06 DIAGNOSIS — N80.129 ENDOMETRIOMA: Primary | ICD-10-CM

## 2020-02-06 PROCEDURE — 99243 OFF/OP CNSLTJ NEW/EST LOW 30: CPT | Performed by: SURGERY

## 2020-02-06 NOTE — PROGRESS NOTES
SUMMARY (A/P):    44-year-old lady with relatively small and minimally symptomatic endometrioma of the left abdominal wall.  She has the option of excision which I described including possible mesh to repair the abdominal wall in the region of excision versus expectant observation.  Given that her symptoms are minimal she is going to just keep an eye on it and return if it becomes more symptomatic or enlarges.      CC:    Abdominal pain and nodule, referred for consultation by Dr. Montserrat Rangel    HPI:    44-year-old lady noticed 2 months ago while doing a lot of lifting mild to moderate pain in the left lower abdomen associated with palpable nodule.  The pain continued until she finished her period and then stopped.    PSH:    • Bilateral total hip arthroplasty 2018  •  section 2017    PMH:    • Anxiety  • ADHD     FAMILY HISTORY:    • Negative for colorectal cancer    SOCIAL HISTORY:   • Denies tobacco use  • Occasional alcohol use    ALLERGIES: None    MEDICATIONS: reviewed, in Epic    ROS:  No chest pain or shortness of air.  All other systems reviewed and negative other than presenting complaints.    RADIOLOGY/ENDOSCOPY:    • CT abdomen pelvis 2020: Small nodular soft tissue mass within the subcutaneous fat in the left hemipelvis along the course of a previous Pfannenstiel incision that is suspicious for an endometrioma.  I reviewed the images and concur that based on the imaging and clinical history this is almost certainly a small endometrioma.    PHYSICAL EXAM:   • Constitutional: Well-developed well-nourished, no acute distress  • Vital signs: HR 66, weight 167 pounds, height 68 inches, BMI 25.4  • Eyes: Conjunctiva normal, sclera nonicteric  • ENMT: Hearing grossly normal, oral mucosa moist  • Neck: Supple, no palpable mass, trachea midline  • Respiratory: Clear to auscultation, normal inspiratory effort  • Cardiovascular: Regular rate, no murmur, no carotid bruit  • Gastrointestinal: Soft,  nontender.  Palpable 2 cm firm nodule seemingly originating deep from subcutaneous layer left lower abdominal wall just superior to the left lateral aspect of her Pfannenstiel incision.  No erythema or other evidence of infection.  • Lymphatics (palpable nodes):  cervical-negative, inguinal-negative  • Skin:  Warm, dry, no rash on visualized skin surfaces  • Musculoskeletal: Symmetric strength, normal gait  • Psychiatric: Alert and oriented ×3, normal affect     ANGELA ACOSTA M.D.

## 2020-02-12 ENCOUNTER — APPOINTMENT (OUTPATIENT)
Dept: WOMENS IMAGING | Facility: HOSPITAL | Age: 45
End: 2020-02-12

## 2020-02-12 PROCEDURE — 77067 SCR MAMMO BI INCL CAD: CPT | Performed by: RADIOLOGY

## 2020-02-12 PROCEDURE — 77063 BREAST TOMOSYNTHESIS BI: CPT | Performed by: RADIOLOGY

## 2020-08-14 ENCOUNTER — OFFICE VISIT (OUTPATIENT)
Dept: FAMILY MEDICINE CLINIC | Facility: CLINIC | Age: 45
End: 2020-08-14

## 2020-08-14 VITALS
WEIGHT: 170 LBS | OXYGEN SATURATION: 98 % | TEMPERATURE: 97.1 F | RESPIRATION RATE: 14 BRPM | DIASTOLIC BLOOD PRESSURE: 80 MMHG | BODY MASS INDEX: 25.76 KG/M2 | SYSTOLIC BLOOD PRESSURE: 120 MMHG | HEART RATE: 61 BPM | HEIGHT: 68 IN

## 2020-08-14 DIAGNOSIS — H92.03 EAR PAIN, BILATERAL: Primary | ICD-10-CM

## 2020-08-14 DIAGNOSIS — H91.93 DECREASED HEARING OF BOTH EARS: ICD-10-CM

## 2020-08-14 PROCEDURE — 99213 OFFICE O/P EST LOW 20 MIN: CPT | Performed by: NURSE PRACTITIONER

## 2020-08-14 RX ORDER — FLUTICASONE PROPIONATE 50 MCG
2 SPRAY, SUSPENSION (ML) NASAL DAILY
Qty: 1 BOTTLE | Refills: 0
Start: 2020-08-14 | End: 2021-05-12

## 2020-08-14 NOTE — PATIENT INSTRUCTIONS
Continue Zyrtec as prescribed.  May use flonase over the counter as directed.  Return if symptoms worsen or fail to improve.   Call with any questions or concerns.    Pt returned my call. She is a little sore and nauseated.  She is feeling better every day.  Plan is to have her call in 6 weeks to determine new normal and see if she is still symptomatic.  All questions were answered.  WYATT Jose RN, BSN  Interventional Radiology Care Coordinator   Phone:  [phone]

## 2020-08-14 NOTE — PROGRESS NOTES
Subjective   Sil Ly is a 44 y.o. female.     History of Present Illness     Pt presents with c/o of decreased hearing in her ears and intermittent pain in ears bilaterally. Pt reports that her symptoms started about 6 months ago. She is not currently taking any medications for this.     The following portions of the patient's history were reviewed and updated as appropriate: allergies, current medications, past family history, past medical history, past social history, past surgical history and problem list.    Review of Systems   Constitutional: Negative for appetite change, chills, fatigue and fever.   HENT: Positive for ear pain. Negative for congestion, ear discharge, postnasal drip, rhinorrhea, sinus pressure, sneezing, sore throat and tinnitus.    Eyes: Negative for redness and itching.   Respiratory: Negative for cough, chest tightness and shortness of breath.    Cardiovascular: Negative for chest pain, palpitations and leg swelling.   Musculoskeletal: Negative for myalgias.   Allergic/Immunologic: Negative.    Neurological: Negative for dizziness and headache.       Objective   Physical Exam   Constitutional: She is oriented to person, place, and time. She appears well-developed and well-nourished.   HENT:   Head: Normocephalic and atraumatic.   Right Ear: External ear and ear canal normal. A middle ear effusion is present.   Left Ear: External ear and ear canal normal. A middle ear effusion is present.   Nose: Nose normal.   Mouth/Throat: Oropharynx is clear and moist. No oropharyngeal exudate.   Eyes: Pupils are equal, round, and reactive to light. Conjunctivae and EOM are normal. Right eye exhibits no discharge. Left eye exhibits no discharge.   Neck: Normal range of motion. Neck supple. No thyromegaly present.   Cardiovascular: Normal rate, regular rhythm, normal heart sounds and intact distal pulses.   No murmur heard.  Pulmonary/Chest: Effort normal and breath sounds normal. No tachypnea. No  respiratory distress. She has no decreased breath sounds. She has no wheezes. She has no rales.   Lymphadenopathy:     She has no cervical adenopathy.   Neurological: She is alert and oriented to person, place, and time.   Skin: Skin is warm and dry.   Psychiatric: She has a normal mood and affect. Her behavior is normal. Judgment and thought content normal.   Nursing note and vitals reviewed.      Vitals:    08/14/20 1125   BP: 120/80   Pulse: 61   Resp: 14   Temp: 97.1 °F (36.2 °C)   SpO2: 98%     Body mass index is 25.85 kg/m².    Procedures    Assessment/Plan   Problems Addressed this Visit     None      Visit Diagnoses     Ear pain, bilateral    -  Primary    Relevant Medications    fluticasone (Flonase) 50 MCG/ACT nasal spray    Other Relevant Orders    Ambulatory Referral to ENT (Otolaryngology)    Decreased hearing of both ears        Relevant Medications    fluticasone (Flonase) 50 MCG/ACT nasal spray    Other Relevant Orders    Ambulatory Referral to ENT (Otolaryngology)        Continue Zyrtec 10mg 1 p.o.QD.    Return if symptoms worsen or fail to improve.        Patient Instructions   Continue Zyrtec as prescribed.  May use flonase over the counter as directed.  Return if symptoms worsen or fail to improve.   Call with any questions or concerns.

## 2021-01-22 ENCOUNTER — HOSPITAL ENCOUNTER (EMERGENCY)
Facility: HOSPITAL | Age: 46
Discharge: LEFT BEFORE DISCHARGE | End: 2021-01-22

## 2021-01-22 VITALS
HEART RATE: 119 BPM | HEIGHT: 67 IN | OXYGEN SATURATION: 93 % | SYSTOLIC BLOOD PRESSURE: 124 MMHG | DIASTOLIC BLOOD PRESSURE: 70 MMHG | TEMPERATURE: 98.9 F | RESPIRATION RATE: 18 BRPM | BODY MASS INDEX: 26.63 KG/M2

## 2021-01-22 LAB
ALBUMIN SERPL-MCNC: 3.6 G/DL (ref 3.5–5.2)
ALBUMIN/GLOB SERPL: 1.4 G/DL
ALP SERPL-CCNC: 66 U/L (ref 39–117)
ALT SERPL W P-5'-P-CCNC: 9 U/L (ref 1–33)
ANION GAP SERPL CALCULATED.3IONS-SCNC: 9.6 MMOL/L (ref 5–15)
AST SERPL-CCNC: 9 U/L (ref 1–32)
BACTERIA UR QL AUTO: ABNORMAL /HPF
BASOPHILS # BLD AUTO: 0.03 10*3/MM3 (ref 0–0.2)
BASOPHILS NFR BLD AUTO: 0.4 % (ref 0–1.5)
BILIRUB SERPL-MCNC: 0.6 MG/DL (ref 0–1.2)
BILIRUB UR QL STRIP: NEGATIVE
BUN SERPL-MCNC: 14 MG/DL (ref 6–20)
BUN/CREAT SERPL: 17.9 (ref 7–25)
CALCIUM SPEC-SCNC: 8.4 MG/DL (ref 8.6–10.5)
CHLORIDE SERPL-SCNC: 105 MMOL/L (ref 98–107)
CLARITY UR: ABNORMAL
CO2 SERPL-SCNC: 22.4 MMOL/L (ref 22–29)
COLOR UR: ABNORMAL
CREAT SERPL-MCNC: 0.78 MG/DL (ref 0.57–1)
DEPRECATED RDW RBC AUTO: 42 FL (ref 37–54)
EOSINOPHIL # BLD AUTO: 0.17 10*3/MM3 (ref 0–0.4)
EOSINOPHIL NFR BLD AUTO: 2.4 % (ref 0.3–6.2)
ERYTHROCYTE [DISTWIDTH] IN BLOOD BY AUTOMATED COUNT: 12.1 % (ref 12.3–15.4)
GFR SERPL CREATININE-BSD FRML MDRD: 80 ML/MIN/1.73
GLOBULIN UR ELPH-MCNC: 2.6 GM/DL
GLUCOSE SERPL-MCNC: 142 MG/DL (ref 65–99)
GLUCOSE UR STRIP-MCNC: NEGATIVE MG/DL
HCG SERPL QL: NEGATIVE
HCT VFR BLD AUTO: 37.8 % (ref 34–46.6)
HGB BLD-MCNC: 13 G/DL (ref 12–15.9)
HGB UR QL STRIP.AUTO: ABNORMAL
HOLD SPECIMEN: NORMAL
HYALINE CASTS UR QL AUTO: ABNORMAL /LPF
IMM GRANULOCYTES # BLD AUTO: 0.02 10*3/MM3 (ref 0–0.05)
IMM GRANULOCYTES NFR BLD AUTO: 0.3 % (ref 0–0.5)
KETONES UR QL STRIP: ABNORMAL
LEUKOCYTE ESTERASE UR QL STRIP.AUTO: NEGATIVE
LIPASE SERPL-CCNC: 34 U/L (ref 13–60)
LYMPHOCYTES # BLD AUTO: 0.59 10*3/MM3 (ref 0.7–3.1)
LYMPHOCYTES NFR BLD AUTO: 8.4 % (ref 19.6–45.3)
MCH RBC QN AUTO: 32.4 PG (ref 26.6–33)
MCHC RBC AUTO-ENTMCNC: 34.4 G/DL (ref 31.5–35.7)
MCV RBC AUTO: 94.3 FL (ref 79–97)
MONOCYTES # BLD AUTO: 0.6 10*3/MM3 (ref 0.1–0.9)
MONOCYTES NFR BLD AUTO: 8.6 % (ref 5–12)
NEUTROPHILS NFR BLD AUTO: 5.58 10*3/MM3 (ref 1.7–7)
NEUTROPHILS NFR BLD AUTO: 79.9 % (ref 42.7–76)
NITRITE UR QL STRIP: POSITIVE
NRBC BLD AUTO-RTO: 0 /100 WBC (ref 0–0.2)
PH UR STRIP.AUTO: <=5 [PH] (ref 5–8)
PLATELET # BLD AUTO: 199 10*3/MM3 (ref 140–450)
PMV BLD AUTO: 12 FL (ref 6–12)
POTASSIUM SERPL-SCNC: 3.8 MMOL/L (ref 3.5–5.2)
PROT SERPL-MCNC: 6.2 G/DL (ref 6–8.5)
PROT UR QL STRIP: ABNORMAL
RBC # BLD AUTO: 4.01 10*6/MM3 (ref 3.77–5.28)
RBC # UR: ABNORMAL /HPF
REF LAB TEST METHOD: ABNORMAL
SODIUM SERPL-SCNC: 137 MMOL/L (ref 136–145)
SP GR UR STRIP: >=1.03 (ref 1–1.03)
SQUAMOUS #/AREA URNS HPF: ABNORMAL /HPF
UROBILINOGEN UR QL STRIP: ABNORMAL
WBC # BLD AUTO: 6.99 10*3/MM3 (ref 3.4–10.8)
WBC UR QL AUTO: ABNORMAL /HPF
WHOLE BLOOD HOLD SPECIMEN: NORMAL
WHOLE BLOOD HOLD SPECIMEN: NORMAL

## 2021-01-22 PROCEDURE — 85025 COMPLETE CBC W/AUTO DIFF WBC: CPT

## 2021-01-22 PROCEDURE — 81001 URINALYSIS AUTO W/SCOPE: CPT

## 2021-01-22 PROCEDURE — 80053 COMPREHEN METABOLIC PANEL: CPT

## 2021-01-22 PROCEDURE — 83690 ASSAY OF LIPASE: CPT

## 2021-01-22 PROCEDURE — 84703 CHORIONIC GONADOTROPIN ASSAY: CPT

## 2021-01-22 PROCEDURE — 99211 OFF/OP EST MAY X REQ PHY/QHP: CPT

## 2021-01-22 PROCEDURE — 36415 COLL VENOUS BLD VENIPUNCTURE: CPT

## 2021-01-22 RX ORDER — SODIUM CHLORIDE 0.9 % (FLUSH) 0.9 %
10 SYRINGE (ML) INJECTION AS NEEDED
Status: DISCONTINUED | OUTPATIENT
Start: 2021-01-22 | End: 2021-01-22 | Stop reason: HOSPADM

## 2021-01-25 ENCOUNTER — TRANSCRIBE ORDERS (OUTPATIENT)
Dept: ADMINISTRATIVE | Facility: HOSPITAL | Age: 46
End: 2021-01-25

## 2021-01-25 DIAGNOSIS — R10.32 LEFT LOWER QUADRANT ABDOMINAL PAIN: Primary | ICD-10-CM

## 2021-01-28 ENCOUNTER — HOSPITAL ENCOUNTER (OUTPATIENT)
Dept: CT IMAGING | Facility: HOSPITAL | Age: 46
Discharge: HOME | End: 2021-01-28
Admitting: OBSTETRICS & GYNECOLOGY

## 2021-01-28 DIAGNOSIS — R10.32 LEFT LOWER QUADRANT ABDOMINAL PAIN: ICD-10-CM

## 2021-01-28 PROCEDURE — 74177 CT ABD & PELVIS W/CONTRAST: CPT

## 2021-01-28 PROCEDURE — 25010000002 IOPAMIDOL 61 % SOLUTION: Performed by: OBSTETRICS & GYNECOLOGY

## 2021-01-28 RX ADMIN — IOPAMIDOL 85 ML: 612 INJECTION, SOLUTION INTRAVENOUS at 10:01

## 2021-02-25 ENCOUNTER — OFFICE VISIT (OUTPATIENT)
Dept: SURGERY | Facility: CLINIC | Age: 46
End: 2021-02-25

## 2021-02-25 VITALS — HEIGHT: 67 IN | WEIGHT: 171.4 LBS | BODY MASS INDEX: 26.9 KG/M2

## 2021-02-25 DIAGNOSIS — N80.129 ENDOMETRIOMA: Primary | ICD-10-CM

## 2021-02-25 PROCEDURE — 99213 OFFICE O/P EST LOW 20 MIN: CPT | Performed by: PHYSICIAN ASSISTANT

## 2021-02-25 RX ORDER — NORETHINDRONE ACETATE AND ETHINYL ESTRADIOL 1MG-20(21)
1 KIT ORAL DAILY
Status: ON HOLD | COMMUNITY
End: 2022-11-29

## 2021-02-25 NOTE — PROGRESS NOTES
"CC:    Endometrioma    HPI:    This is a 45-year-old lady returning to the office today after having last been seen approximately 1 year ago for an endometrioma.  Recent CT scan has shown interval increase in size from 15 mm to 26 mm of the known endometrioma in her left lower quadrant.  She states that it was enlarging and becoming extremely tender until 1 month ago when she started birth control in hopes of alleviating her symptoms.  Over the course of this last month she has had no issues associated with this and during her last menstrual cycle states that the endometrioma did not enlarge or become tender as it previously had been.    PMH:    ADHD  Anxiety  Insomnia  Osteoarthritis    PSH:    Total hip arthroplasty bilateral    Bunionectomy  [place] tooth extraction    SH:  Does not smoke, does consume alcohol    FMH:  No colorectal cancer no family history    ALLERGIES:   No known drug allergies    MEDICATIONS:  Reviewed and reconciled in Epic.    ROS:    All other systems reviewed and negative other than presenting complaints.    PE:  Vitals: Height 67\" weight 171 BMI 26.8  Constitutional: Well-nourished, well-developed female no acute distress  HEENT: Normocephalic, atraumatic, sclera anicteric, normal conjunctiva  Pulmonary: Clear to auscultation bilaterally, normal respiratory effort, no wheezes, rales or rhonchi noted  Cardiac: Regular rate and rhythm, no murmurs, gallops or rubs noted  Abdomen: Normal bowel sounds, soft, nontender  Psych: Alert and oriented x3, normal affect, normal judgment    CLINICAL SUMMARY (A/P):    This is a 45-year-old lady returning to the office today after having last been seen 1 year ago for an endometrioma.  CT scan has demonstrated an interval increase in size with it having been 15 mm on 2020 and increasing to 26 mm on 2021.  She was recently started oral birth control and has noticed a resolution of symptoms of enlargement and tenderness with " this endometrioma during her cycle.  With this we are recommending a repeat CT in 6 months while on birth control to ensure stability of this endometrioma.  If it continues to increase in size for she has a return of her symptoms during her menstrual cycle we will likely recommend excision of this endometrioma.  All questions were answered and she is willing to proceed with all recommendations.      Navi Michelle PA-C

## 2021-04-22 ENCOUNTER — PREP FOR SURGERY (OUTPATIENT)
Dept: OTHER | Facility: HOSPITAL | Age: 46
End: 2021-04-22

## 2021-04-22 ENCOUNTER — TELEPHONE (OUTPATIENT)
Dept: SURGERY | Facility: CLINIC | Age: 46
End: 2021-04-22

## 2021-04-22 DIAGNOSIS — N80.129 ENDOMETRIOMA: Primary | ICD-10-CM

## 2021-04-22 RX ORDER — CEFAZOLIN SODIUM 2 G/100ML
2 INJECTION, SOLUTION INTRAVENOUS ONCE
Status: CANCELLED | OUTPATIENT
Start: 2021-05-14 | End: 2021-04-22

## 2021-04-22 NOTE — TELEPHONE ENCOUNTER
Patient called the office stating that endometrioma has gotten worse and would like to proceed with surgery versus waiting another 4 months to repeat her CT scan.    Do you need to see her back in the office to discuss surgery? If not please place surgery orders.

## 2021-04-23 PROBLEM — N80.129 ENDOMETRIOMA: Status: ACTIVE | Noted: 2021-04-23

## 2021-05-12 ENCOUNTER — PRE-ADMISSION TESTING (OUTPATIENT)
Dept: PREADMISSION TESTING | Facility: HOSPITAL | Age: 46
End: 2021-05-12

## 2021-05-12 VITALS
TEMPERATURE: 98.5 F | HEIGHT: 68 IN | WEIGHT: 170 LBS | BODY MASS INDEX: 25.76 KG/M2 | HEART RATE: 62 BPM | OXYGEN SATURATION: 99 % | DIASTOLIC BLOOD PRESSURE: 87 MMHG | RESPIRATION RATE: 16 BRPM | SYSTOLIC BLOOD PRESSURE: 129 MMHG

## 2021-05-12 LAB
ANION GAP SERPL CALCULATED.3IONS-SCNC: 10.3 MMOL/L (ref 5–15)
BUN SERPL-MCNC: 10 MG/DL (ref 6–20)
BUN/CREAT SERPL: 12.7 (ref 7–25)
CALCIUM SPEC-SCNC: 8.9 MG/DL (ref 8.6–10.5)
CHLORIDE SERPL-SCNC: 104 MMOL/L (ref 98–107)
CO2 SERPL-SCNC: 23.7 MMOL/L (ref 22–29)
CREAT SERPL-MCNC: 0.79 MG/DL (ref 0.57–1)
DEPRECATED RDW RBC AUTO: 42.2 FL (ref 37–54)
ERYTHROCYTE [DISTWIDTH] IN BLOOD BY AUTOMATED COUNT: 12.2 % (ref 12.3–15.4)
GFR SERPL CREATININE-BSD FRML MDRD: 79 ML/MIN/1.73
GLUCOSE SERPL-MCNC: 83 MG/DL (ref 65–99)
HCG SERPL QL: NEGATIVE
HCT VFR BLD AUTO: 46.3 % (ref 34–46.6)
HGB BLD-MCNC: 15.9 G/DL (ref 12–15.9)
MCH RBC QN AUTO: 32.6 PG (ref 26.6–33)
MCHC RBC AUTO-ENTMCNC: 34.3 G/DL (ref 31.5–35.7)
MCV RBC AUTO: 94.9 FL (ref 79–97)
PLATELET # BLD AUTO: 264 10*3/MM3 (ref 140–450)
PMV BLD AUTO: 12 FL (ref 6–12)
POTASSIUM SERPL-SCNC: 4.1 MMOL/L (ref 3.5–5.2)
RBC # BLD AUTO: 4.88 10*6/MM3 (ref 3.77–5.28)
SARS-COV-2 ORF1AB RESP QL NAA+PROBE: NOT DETECTED
SODIUM SERPL-SCNC: 138 MMOL/L (ref 136–145)
WBC # BLD AUTO: 7.08 10*3/MM3 (ref 3.4–10.8)

## 2021-05-12 PROCEDURE — 84703 CHORIONIC GONADOTROPIN ASSAY: CPT

## 2021-05-12 PROCEDURE — C9803 HOPD COVID-19 SPEC COLLECT: HCPCS | Performed by: NURSE PRACTITIONER

## 2021-05-12 PROCEDURE — U0004 COV-19 TEST NON-CDC HGH THRU: HCPCS | Performed by: NURSE PRACTITIONER

## 2021-05-12 PROCEDURE — 80048 BASIC METABOLIC PNL TOTAL CA: CPT

## 2021-05-12 PROCEDURE — 36415 COLL VENOUS BLD VENIPUNCTURE: CPT

## 2021-05-12 PROCEDURE — 85027 COMPLETE CBC AUTOMATED: CPT

## 2021-05-12 RX ORDER — CHLORHEXIDINE GLUCONATE 500 MG/1
CLOTH TOPICAL SEE ADMIN INSTRUCTIONS
COMMUNITY
End: 2021-05-14 | Stop reason: HOSPADM

## 2021-05-14 ENCOUNTER — HOSPITAL ENCOUNTER (OUTPATIENT)
Facility: HOSPITAL | Age: 46
Setting detail: HOSPITAL OUTPATIENT SURGERY
Discharge: HOME | End: 2021-05-14
Attending: SURGERY | Admitting: SURGERY

## 2021-05-14 ENCOUNTER — ANESTHESIA EVENT (OUTPATIENT)
Dept: PERIOP | Facility: HOSPITAL | Age: 46
End: 2021-05-14

## 2021-05-14 ENCOUNTER — ANESTHESIA (OUTPATIENT)
Dept: PERIOP | Facility: HOSPITAL | Age: 46
End: 2021-05-14

## 2021-05-14 VITALS
TEMPERATURE: 97.2 F | HEART RATE: 66 BPM | OXYGEN SATURATION: 98 % | SYSTOLIC BLOOD PRESSURE: 101 MMHG | RESPIRATION RATE: 16 BRPM | DIASTOLIC BLOOD PRESSURE: 75 MMHG

## 2021-05-14 DIAGNOSIS — N80.129 ENDOMETRIOMA: ICD-10-CM

## 2021-05-14 PROCEDURE — 25010000002 KETOROLAC TROMETHAMINE PER 15 MG: Performed by: NURSE ANESTHETIST, CERTIFIED REGISTERED

## 2021-05-14 PROCEDURE — S0260 H&P FOR SURGERY: HCPCS | Performed by: SURGERY

## 2021-05-14 PROCEDURE — 25010000002 MIDAZOLAM PER 1 MG: Performed by: ANESTHESIOLOGY

## 2021-05-14 PROCEDURE — 22900 EXC ABDL TUM DEEP < 5 CM: CPT | Performed by: SURGERY

## 2021-05-14 PROCEDURE — 25010000002 FENTANYL CITRATE (PF) 100 MCG/2ML SOLUTION: Performed by: NURSE ANESTHETIST, CERTIFIED REGISTERED

## 2021-05-14 PROCEDURE — 88307 TISSUE EXAM BY PATHOLOGIST: CPT | Performed by: SURGERY

## 2021-05-14 PROCEDURE — 25010000002 PROPOFOL 10 MG/ML EMULSION: Performed by: NURSE ANESTHETIST, CERTIFIED REGISTERED

## 2021-05-14 PROCEDURE — 25010000002 DEXAMETHASONE PER 1 MG: Performed by: NURSE ANESTHETIST, CERTIFIED REGISTERED

## 2021-05-14 PROCEDURE — 25010000003 CEFAZOLIN IN DEXTROSE 2-4 GM/100ML-% SOLUTION: Performed by: SURGERY

## 2021-05-14 PROCEDURE — 25010000002 ONDANSETRON PER 1 MG: Performed by: NURSE ANESTHETIST, CERTIFIED REGISTERED

## 2021-05-14 RX ORDER — LIDOCAINE HYDROCHLORIDE 20 MG/ML
INJECTION, SOLUTION INFILTRATION; PERINEURAL AS NEEDED
Status: DISCONTINUED | OUTPATIENT
Start: 2021-05-14 | End: 2021-05-14 | Stop reason: SURG

## 2021-05-14 RX ORDER — PROPOFOL 10 MG/ML
VIAL (ML) INTRAVENOUS AS NEEDED
Status: DISCONTINUED | OUTPATIENT
Start: 2021-05-14 | End: 2021-05-14 | Stop reason: SURG

## 2021-05-14 RX ORDER — FENTANYL CITRATE 50 UG/ML
INJECTION, SOLUTION INTRAMUSCULAR; INTRAVENOUS AS NEEDED
Status: DISCONTINUED | OUTPATIENT
Start: 2021-05-14 | End: 2021-05-14 | Stop reason: SURG

## 2021-05-14 RX ORDER — PROMETHAZINE HYDROCHLORIDE 25 MG/1
25 TABLET ORAL ONCE AS NEEDED
Status: DISCONTINUED | OUTPATIENT
Start: 2021-05-14 | End: 2021-05-14 | Stop reason: HOSPADM

## 2021-05-14 RX ORDER — KETOROLAC TROMETHAMINE 30 MG/ML
INJECTION, SOLUTION INTRAMUSCULAR; INTRAVENOUS AS NEEDED
Status: DISCONTINUED | OUTPATIENT
Start: 2021-05-14 | End: 2021-05-14 | Stop reason: SURG

## 2021-05-14 RX ORDER — HYDROMORPHONE HYDROCHLORIDE 1 MG/ML
0.25 INJECTION, SOLUTION INTRAMUSCULAR; INTRAVENOUS; SUBCUTANEOUS
Status: DISCONTINUED | OUTPATIENT
Start: 2021-05-14 | End: 2021-05-14 | Stop reason: HOSPADM

## 2021-05-14 RX ORDER — FENTANYL CITRATE 50 UG/ML
50 INJECTION, SOLUTION INTRAMUSCULAR; INTRAVENOUS
Status: DISCONTINUED | OUTPATIENT
Start: 2021-05-14 | End: 2021-05-14 | Stop reason: HOSPADM

## 2021-05-14 RX ORDER — CEFAZOLIN SODIUM 2 G/100ML
2 INJECTION, SOLUTION INTRAVENOUS ONCE
Status: COMPLETED | OUTPATIENT
Start: 2021-05-14 | End: 2021-05-14

## 2021-05-14 RX ORDER — HYDROCODONE BITARTRATE AND ACETAMINOPHEN 5; 325 MG/1; MG/1
TABLET ORAL
Qty: 24 TABLET | Refills: 0 | Status: SHIPPED | OUTPATIENT
Start: 2021-05-14 | End: 2021-05-17 | Stop reason: SDUPTHER

## 2021-05-14 RX ORDER — MIDAZOLAM HYDROCHLORIDE 1 MG/ML
1 INJECTION INTRAMUSCULAR; INTRAVENOUS
Status: DISCONTINUED | OUTPATIENT
Start: 2021-05-14 | End: 2021-05-14 | Stop reason: HOSPADM

## 2021-05-14 RX ORDER — HYDRALAZINE HYDROCHLORIDE 20 MG/ML
5 INJECTION INTRAMUSCULAR; INTRAVENOUS
Status: DISCONTINUED | OUTPATIENT
Start: 2021-05-14 | End: 2021-05-14 | Stop reason: HOSPADM

## 2021-05-14 RX ORDER — ROCURONIUM BROMIDE 10 MG/ML
INJECTION, SOLUTION INTRAVENOUS AS NEEDED
Status: DISCONTINUED | OUTPATIENT
Start: 2021-05-14 | End: 2021-05-14 | Stop reason: SURG

## 2021-05-14 RX ORDER — ONDANSETRON 2 MG/ML
INJECTION INTRAMUSCULAR; INTRAVENOUS AS NEEDED
Status: DISCONTINUED | OUTPATIENT
Start: 2021-05-14 | End: 2021-05-14 | Stop reason: SURG

## 2021-05-14 RX ORDER — NALBUPHINE HCL 10 MG/ML
2 AMPUL (ML) INJECTION EVERY 4 HOURS PRN
Status: DISCONTINUED | OUTPATIENT
Start: 2021-05-14 | End: 2021-05-14 | Stop reason: HOSPADM

## 2021-05-14 RX ORDER — SODIUM CHLORIDE 0.9 % (FLUSH) 0.9 %
3-10 SYRINGE (ML) INJECTION AS NEEDED
Status: DISCONTINUED | OUTPATIENT
Start: 2021-05-14 | End: 2021-05-14 | Stop reason: HOSPADM

## 2021-05-14 RX ORDER — NALBUPHINE HCL 10 MG/ML
10 AMPUL (ML) INJECTION EVERY 4 HOURS PRN
Status: DISCONTINUED | OUTPATIENT
Start: 2021-05-14 | End: 2021-05-14 | Stop reason: HOSPADM

## 2021-05-14 RX ORDER — DIPHENHYDRAMINE HYDROCHLORIDE 50 MG/ML
12.5 INJECTION INTRAMUSCULAR; INTRAVENOUS
Status: DISCONTINUED | OUTPATIENT
Start: 2021-05-14 | End: 2021-05-14 | Stop reason: HOSPADM

## 2021-05-14 RX ORDER — ONDANSETRON 4 MG/1
4 TABLET, FILM COATED ORAL EVERY 6 HOURS PRN
Qty: 10 TABLET | Refills: 1 | Status: SHIPPED | OUTPATIENT
Start: 2021-05-14 | End: 2021-06-03

## 2021-05-14 RX ORDER — BUPIVACAINE HYDROCHLORIDE AND EPINEPHRINE 5; 5 MG/ML; UG/ML
INJECTION, SOLUTION EPIDURAL; INTRACAUDAL; PERINEURAL AS NEEDED
Status: DISCONTINUED | OUTPATIENT
Start: 2021-05-14 | End: 2021-05-14 | Stop reason: HOSPADM

## 2021-05-14 RX ORDER — SODIUM CHLORIDE 0.9 % (FLUSH) 0.9 %
3 SYRINGE (ML) INJECTION EVERY 12 HOURS SCHEDULED
Status: DISCONTINUED | OUTPATIENT
Start: 2021-05-14 | End: 2021-05-14 | Stop reason: HOSPADM

## 2021-05-14 RX ORDER — FENTANYL CITRATE 50 UG/ML
100 INJECTION, SOLUTION INTRAMUSCULAR; INTRAVENOUS
Status: DISCONTINUED | OUTPATIENT
Start: 2021-05-14 | End: 2021-05-14 | Stop reason: HOSPADM

## 2021-05-14 RX ORDER — SODIUM CHLORIDE, SODIUM LACTATE, POTASSIUM CHLORIDE, CALCIUM CHLORIDE 600; 310; 30; 20 MG/100ML; MG/100ML; MG/100ML; MG/100ML
9 INJECTION, SOLUTION INTRAVENOUS CONTINUOUS
Status: DISCONTINUED | OUTPATIENT
Start: 2021-05-14 | End: 2021-05-14 | Stop reason: HOSPADM

## 2021-05-14 RX ORDER — LIDOCAINE HYDROCHLORIDE 10 MG/ML
0.5 INJECTION, SOLUTION EPIDURAL; INFILTRATION; INTRACAUDAL; PERINEURAL ONCE AS NEEDED
Status: DISCONTINUED | OUTPATIENT
Start: 2021-05-14 | End: 2021-05-14 | Stop reason: HOSPADM

## 2021-05-14 RX ORDER — ONDANSETRON 2 MG/ML
4 INJECTION INTRAMUSCULAR; INTRAVENOUS ONCE AS NEEDED
Status: DISCONTINUED | OUTPATIENT
Start: 2021-05-14 | End: 2021-05-14 | Stop reason: HOSPADM

## 2021-05-14 RX ORDER — HYDROCODONE BITARTRATE AND ACETAMINOPHEN 5; 325 MG/1; MG/1
1 TABLET ORAL ONCE AS NEEDED
Status: COMPLETED | OUTPATIENT
Start: 2021-05-14 | End: 2021-05-14

## 2021-05-14 RX ORDER — NALOXONE HCL 0.4 MG/ML
0.4 VIAL (ML) INJECTION AS NEEDED
Status: DISCONTINUED | OUTPATIENT
Start: 2021-05-14 | End: 2021-05-14 | Stop reason: HOSPADM

## 2021-05-14 RX ORDER — MAGNESIUM HYDROXIDE 1200 MG/15ML
LIQUID ORAL AS NEEDED
Status: DISCONTINUED | OUTPATIENT
Start: 2021-05-14 | End: 2021-05-14 | Stop reason: HOSPADM

## 2021-05-14 RX ORDER — DEXAMETHASONE SODIUM PHOSPHATE 10 MG/ML
INJECTION INTRAMUSCULAR; INTRAVENOUS AS NEEDED
Status: DISCONTINUED | OUTPATIENT
Start: 2021-05-14 | End: 2021-05-14 | Stop reason: SURG

## 2021-05-14 RX ADMIN — SODIUM CHLORIDE, POTASSIUM CHLORIDE, SODIUM LACTATE AND CALCIUM CHLORIDE 9 ML/HR: 600; 310; 30; 20 INJECTION, SOLUTION INTRAVENOUS at 07:25

## 2021-05-14 RX ADMIN — FENTANYL CITRATE 100 MCG: 50 INJECTION INTRAMUSCULAR; INTRAVENOUS at 08:14

## 2021-05-14 RX ADMIN — ROCURONIUM BROMIDE 40 MG: 50 INJECTION INTRAVENOUS at 08:18

## 2021-05-14 RX ADMIN — MIDAZOLAM 1 MG: 1 INJECTION INTRAMUSCULAR; INTRAVENOUS at 07:24

## 2021-05-14 RX ADMIN — SUGAMMADEX 200 MG: 100 INJECTION, SOLUTION INTRAVENOUS at 08:50

## 2021-05-14 RX ADMIN — KETOROLAC TROMETHAMINE 30 MG: 30 INJECTION, SOLUTION INTRAMUSCULAR at 08:51

## 2021-05-14 RX ADMIN — DEXAMETHASONE SODIUM PHOSPHATE 8 MG: 10 INJECTION INTRAMUSCULAR; INTRAVENOUS at 08:38

## 2021-05-14 RX ADMIN — PROPOFOL 200 MG: 10 INJECTION, EMULSION INTRAVENOUS at 08:18

## 2021-05-14 RX ADMIN — LIDOCAINE HYDROCHLORIDE 100 MG: 20 INJECTION, SOLUTION INFILTRATION; PERINEURAL at 08:18

## 2021-05-14 RX ADMIN — HYDROCODONE BITARTRATE AND ACETAMINOPHEN 1 TABLET: 5; 325 TABLET ORAL at 09:18

## 2021-05-14 RX ADMIN — ONDANSETRON 4 MG: 2 INJECTION INTRAMUSCULAR; INTRAVENOUS at 08:48

## 2021-05-14 RX ADMIN — CEFAZOLIN SODIUM 2 G: 2 INJECTION, SOLUTION INTRAVENOUS at 08:21

## 2021-05-14 NOTE — ANESTHESIA POSTPROCEDURE EVALUATION
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        Post Anesthesia Care and Evaluation    Patient location during evaluation: bedside  Patient participation: complete - patient participated  Level of consciousness: awake and alert  Pain management: adequate  Airway patency: patent  Anesthetic complications: No anesthetic complications    Cardiovascular status: acceptable  Respiratory status: acceptable  Hydration status: acceptable    Comments: /75   Pulse 66   Temp 36.2 °C (97.2 °F) (Temporal)   Resp 16   LMP 03/26/2021 Comment: irregular  SpO2 98%

## 2021-05-14 NOTE — ANESTHESIA PROCEDURE NOTES
Airway  Urgency: elective    Date/Time: 5/14/2021 8:20 AM  End Time:5/14/2021 8:20 AM  Airway not difficult    General Information and Staff    Patient location during procedure: OR  Anesthesiologist: Luis Enrique Laboy MD  CRNA: Larisa Clarke CRNA    Indications and Patient Condition  Indications for airway management: airway protection    Preoxygenated: yes  Mask difficulty assessment: 1 - vent by mask    Final Airway Details  Final airway type: endotracheal airway      Successful airway: ETT  Cuffed: yes   Successful intubation technique: direct laryngoscopy  Endotracheal tube insertion site: oral  Blade: Rangel  Blade size: 2  ETT size (mm): 7.0  Cormack-Lehane Classification: grade I - full view of glottis  Placement verified by: chest auscultation and capnometry   Cuff volume (mL): 7  Measured from: lips  ETT/EBT  to lips (cm): 20  Number of attempts at approach: 1  Assessment: lips, teeth, and gum same as pre-op and atraumatic intubation    Additional Comments  SIVI.  EYES TAPED CLOSED PRIOR TO DL.  INTUBATION AS CHARTED ABOVE.  APPEARS ATRAUMATIC.  NO CHANGE TO DENTITION. +ETCO2. +BBS. +CR.

## 2021-05-14 NOTE — ANESTHESIA PREPROCEDURE EVALUATION
Anesthesia Evaluation     no history of anesthetic complications:  NPO Solid Status: > 8 hours             Airway   Mallampati: II  TM distance: >3 FB  Neck ROM: full  No difficulty expected  Dental - normal exam     Pulmonary - negative pulmonary ROS and normal exam   Cardiovascular - negative cardio ROS    Rhythm: regular        Neuro/Psych- negative ROS  GI/Hepatic/Renal/Endo      Musculoskeletal     Abdominal    Substance History      OB/GYN    (+) Pregnant,         Other   arthritis,                      Anesthesia Plan    ASA 2     MAC   (  D/W R&B of GA including but not limited to: heart, lung, liver, kidney, neurologic problems, positioning injuries, dental damage, corneal abrasion and TMJ.  .)  intravenous induction

## 2021-05-17 DIAGNOSIS — N80.129 ENDOMETRIOMA: ICD-10-CM

## 2021-05-17 LAB
LAB AP CASE REPORT: NORMAL
PATH REPORT.FINAL DX SPEC: NORMAL
PATH REPORT.GROSS SPEC: NORMAL

## 2021-05-17 RX ORDER — HYDROCODONE BITARTRATE AND ACETAMINOPHEN 5; 325 MG/1; MG/1
TABLET ORAL
Qty: 12 TABLET | Refills: 0 | Status: SHIPPED | OUTPATIENT
Start: 2021-05-17 | End: 2021-06-03

## 2021-05-17 NOTE — TELEPHONE ENCOUNTER
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 She states that she is still pretty sore and would like enough to get her through today.  Order pended for approval or denial with dispensing 10 tabs spaced out to every 6 hours as needed

## 2021-05-23 ENCOUNTER — APPOINTMENT (OUTPATIENT)
Dept: CT IMAGING | Facility: HOSPITAL | Age: 46
End: 2021-05-23

## 2021-05-23 ENCOUNTER — HOSPITAL ENCOUNTER (OUTPATIENT)
Facility: HOSPITAL | Age: 46
Setting detail: OBSERVATION
Discharge: HOME | End: 2021-05-26
Attending: EMERGENCY MEDICINE | Admitting: SURGERY

## 2021-05-23 DIAGNOSIS — A41.9 SEPSIS, DUE TO UNSPECIFIED ORGANISM, UNSPECIFIED WHETHER ACUTE ORGAN DYSFUNCTION PRESENT (HCC): ICD-10-CM

## 2021-05-23 DIAGNOSIS — R93.5 ABNORMAL CT OF THE ABDOMEN: ICD-10-CM

## 2021-05-23 DIAGNOSIS — L03.311 ABDOMINAL WALL CELLULITIS: Primary | ICD-10-CM

## 2021-05-23 LAB
ALBUMIN SERPL-MCNC: 3.5 G/DL (ref 3.5–5.2)
ALBUMIN/GLOB SERPL: 1.5 G/DL
ALP SERPL-CCNC: 60 U/L (ref 39–117)
ALT SERPL W P-5'-P-CCNC: 10 U/L (ref 1–33)
ANION GAP SERPL CALCULATED.3IONS-SCNC: 7 MMOL/L (ref 5–15)
AST SERPL-CCNC: 7 U/L (ref 1–32)
BACTERIA UR QL AUTO: ABNORMAL /HPF
BASOPHILS # BLD AUTO: 0.07 10*3/MM3 (ref 0–0.2)
BASOPHILS NFR BLD AUTO: 0.4 % (ref 0–1.5)
BILIRUB SERPL-MCNC: 0.6 MG/DL (ref 0–1.2)
BILIRUB UR QL STRIP: ABNORMAL
BUN SERPL-MCNC: 12 MG/DL (ref 6–20)
BUN/CREAT SERPL: 16.2 (ref 7–25)
CALCIUM SPEC-SCNC: 8.1 MG/DL (ref 8.6–10.5)
CHLORIDE SERPL-SCNC: 108 MMOL/L (ref 98–107)
CLARITY UR: ABNORMAL
CO2 SERPL-SCNC: 24 MMOL/L (ref 22–29)
COLOR UR: ABNORMAL
CREAT SERPL-MCNC: 0.74 MG/DL (ref 0.57–1)
D-LACTATE SERPL-SCNC: 1.2 MMOL/L (ref 0.5–2)
DEPRECATED RDW RBC AUTO: 41.2 FL (ref 37–54)
EOSINOPHIL # BLD AUTO: 0.32 10*3/MM3 (ref 0–0.4)
EOSINOPHIL NFR BLD AUTO: 1.9 % (ref 0.3–6.2)
ERYTHROCYTE [DISTWIDTH] IN BLOOD BY AUTOMATED COUNT: 12.2 % (ref 12.3–15.4)
GFR SERPL CREATININE-BSD FRML MDRD: 85 ML/MIN/1.73
GLOBULIN UR ELPH-MCNC: 2.4 GM/DL
GLUCOSE SERPL-MCNC: 162 MG/DL (ref 65–99)
GLUCOSE UR STRIP-MCNC: NEGATIVE MG/DL
HCG SERPL QL: NEGATIVE
HCT VFR BLD AUTO: 39.8 % (ref 34–46.6)
HGB BLD-MCNC: 13.8 G/DL (ref 12–15.9)
HGB UR QL STRIP.AUTO: NEGATIVE
HYALINE CASTS UR QL AUTO: ABNORMAL /LPF
IMM GRANULOCYTES # BLD AUTO: 0.07 10*3/MM3 (ref 0–0.05)
IMM GRANULOCYTES NFR BLD AUTO: 0.4 % (ref 0–0.5)
KETONES UR QL STRIP: ABNORMAL
LEUKOCYTE ESTERASE UR QL STRIP.AUTO: NEGATIVE
LIPASE SERPL-CCNC: 29 U/L (ref 13–60)
LYMPHOCYTES # BLD AUTO: 1.06 10*3/MM3 (ref 0.7–3.1)
LYMPHOCYTES NFR BLD AUTO: 6.3 % (ref 19.6–45.3)
MCH RBC QN AUTO: 31.9 PG (ref 26.6–33)
MCHC RBC AUTO-ENTMCNC: 34.7 G/DL (ref 31.5–35.7)
MCV RBC AUTO: 91.9 FL (ref 79–97)
MONOCYTES # BLD AUTO: 1.19 10*3/MM3 (ref 0.1–0.9)
MONOCYTES NFR BLD AUTO: 7.1 % (ref 5–12)
NEUTROPHILS NFR BLD AUTO: 14.16 10*3/MM3 (ref 1.7–7)
NEUTROPHILS NFR BLD AUTO: 83.9 % (ref 42.7–76)
NITRITE UR QL STRIP: NEGATIVE
NRBC BLD AUTO-RTO: 0 /100 WBC (ref 0–0.2)
PH UR STRIP.AUTO: 6 [PH] (ref 5–8)
PLATELET # BLD AUTO: 267 10*3/MM3 (ref 140–450)
PMV BLD AUTO: 11.3 FL (ref 6–12)
POTASSIUM SERPL-SCNC: 3.7 MMOL/L (ref 3.5–5.2)
PROT SERPL-MCNC: 5.9 G/DL (ref 6–8.5)
PROT UR QL STRIP: ABNORMAL
RBC # BLD AUTO: 4.33 10*6/MM3 (ref 3.77–5.28)
RBC # UR: ABNORMAL /HPF
REF LAB TEST METHOD: ABNORMAL
SODIUM SERPL-SCNC: 139 MMOL/L (ref 136–145)
SP GR UR STRIP: >=1.03 (ref 1–1.03)
SQUAMOUS #/AREA URNS HPF: ABNORMAL /HPF
UROBILINOGEN UR QL STRIP: ABNORMAL
WBC # BLD AUTO: 16.87 10*3/MM3 (ref 3.4–10.8)
WBC UR QL AUTO: ABNORMAL /HPF

## 2021-05-23 PROCEDURE — 96365 THER/PROPH/DIAG IV INF INIT: CPT

## 2021-05-23 PROCEDURE — 25010000002 ONDANSETRON PER 1 MG: Performed by: EMERGENCY MEDICINE

## 2021-05-23 PROCEDURE — 96375 TX/PRO/DX INJ NEW DRUG ADDON: CPT

## 2021-05-23 PROCEDURE — 74177 CT ABD & PELVIS W/CONTRAST: CPT

## 2021-05-23 PROCEDURE — 81001 URINALYSIS AUTO W/SCOPE: CPT | Performed by: EMERGENCY MEDICINE

## 2021-05-23 PROCEDURE — 96361 HYDRATE IV INFUSION ADD-ON: CPT

## 2021-05-23 PROCEDURE — 83605 ASSAY OF LACTIC ACID: CPT | Performed by: EMERGENCY MEDICINE

## 2021-05-23 PROCEDURE — 25010000002 IOPAMIDOL 61 % SOLUTION: Performed by: EMERGENCY MEDICINE

## 2021-05-23 PROCEDURE — 80053 COMPREHEN METABOLIC PANEL: CPT | Performed by: EMERGENCY MEDICINE

## 2021-05-23 PROCEDURE — 83690 ASSAY OF LIPASE: CPT | Performed by: EMERGENCY MEDICINE

## 2021-05-23 PROCEDURE — 99284 EMERGENCY DEPT VISIT MOD MDM: CPT

## 2021-05-23 PROCEDURE — 25010000002 MORPHINE PER 10 MG: Performed by: EMERGENCY MEDICINE

## 2021-05-23 PROCEDURE — U0004 COV-19 TEST NON-CDC HGH THRU: HCPCS | Performed by: EMERGENCY MEDICINE

## 2021-05-23 PROCEDURE — 85025 COMPLETE CBC W/AUTO DIFF WBC: CPT | Performed by: EMERGENCY MEDICINE

## 2021-05-23 PROCEDURE — 87040 BLOOD CULTURE FOR BACTERIA: CPT | Performed by: EMERGENCY MEDICINE

## 2021-05-23 PROCEDURE — 84703 CHORIONIC GONADOTROPIN ASSAY: CPT | Performed by: EMERGENCY MEDICINE

## 2021-05-23 RX ORDER — MORPHINE SULFATE 2 MG/ML
4 INJECTION, SOLUTION INTRAMUSCULAR; INTRAVENOUS ONCE
Status: COMPLETED | OUTPATIENT
Start: 2021-05-23 | End: 2021-05-23

## 2021-05-23 RX ORDER — CLINDAMYCIN PHOSPHATE 600 MG/50ML
600 INJECTION INTRAVENOUS ONCE
Status: COMPLETED | OUTPATIENT
Start: 2021-05-23 | End: 2021-05-24

## 2021-05-23 RX ORDER — ONDANSETRON 2 MG/ML
8 INJECTION INTRAMUSCULAR; INTRAVENOUS ONCE
Status: COMPLETED | OUTPATIENT
Start: 2021-05-23 | End: 2021-05-23

## 2021-05-23 RX ADMIN — IOPAMIDOL 85 ML: 612 INJECTION, SOLUTION INTRAVENOUS at 22:02

## 2021-05-23 RX ADMIN — CLINDAMYCIN PHOSPHATE 600 MG: 600 INJECTION, SOLUTION INTRAVENOUS at 23:50

## 2021-05-23 RX ADMIN — ONDANSETRON 8 MG: 2 INJECTION INTRAMUSCULAR; INTRAVENOUS at 20:24

## 2021-05-23 RX ADMIN — SODIUM CHLORIDE 1000 ML: 9 INJECTION, SOLUTION INTRAVENOUS at 20:24

## 2021-05-23 RX ADMIN — MORPHINE SULFATE 4 MG: 2 INJECTION, SOLUTION INTRAMUSCULAR; INTRAVENOUS at 20:24

## 2021-05-24 LAB
ALBUMIN SERPL-MCNC: 3.2 G/DL (ref 3.5–5.2)
ALBUMIN/GLOB SERPL: 1.3 G/DL
ALP SERPL-CCNC: 52 U/L (ref 39–117)
ALT SERPL W P-5'-P-CCNC: 9 U/L (ref 1–33)
ANION GAP SERPL CALCULATED.3IONS-SCNC: 5.5 MMOL/L (ref 5–15)
AST SERPL-CCNC: 8 U/L (ref 1–32)
BILIRUB SERPL-MCNC: 0.8 MG/DL (ref 0–1.2)
BUN SERPL-MCNC: 10 MG/DL (ref 6–20)
BUN/CREAT SERPL: 14.7 (ref 7–25)
CALCIUM SPEC-SCNC: 8.1 MG/DL (ref 8.6–10.5)
CHLORIDE SERPL-SCNC: 110 MMOL/L (ref 98–107)
CO2 SERPL-SCNC: 23.5 MMOL/L (ref 22–29)
CREAT SERPL-MCNC: 0.68 MG/DL (ref 0.57–1)
DEPRECATED RDW RBC AUTO: 40.1 FL (ref 37–54)
ERYTHROCYTE [DISTWIDTH] IN BLOOD BY AUTOMATED COUNT: 12 % (ref 12.3–15.4)
GFR SERPL CREATININE-BSD FRML MDRD: 94 ML/MIN/1.73
GLOBULIN UR ELPH-MCNC: 2.4 GM/DL
GLUCOSE SERPL-MCNC: 97 MG/DL (ref 65–99)
HCT VFR BLD AUTO: 36.6 % (ref 34–46.6)
HGB BLD-MCNC: 12.9 G/DL (ref 12–15.9)
MCH RBC QN AUTO: 32.4 PG (ref 26.6–33)
MCHC RBC AUTO-ENTMCNC: 35.2 G/DL (ref 31.5–35.7)
MCV RBC AUTO: 92 FL (ref 79–97)
PLATELET # BLD AUTO: 210 10*3/MM3 (ref 140–450)
PMV BLD AUTO: 11.6 FL (ref 6–12)
POTASSIUM SERPL-SCNC: 4.2 MMOL/L (ref 3.5–5.2)
PROT SERPL-MCNC: 5.6 G/DL (ref 6–8.5)
RBC # BLD AUTO: 3.98 10*6/MM3 (ref 3.77–5.28)
SARS-COV-2 ORF1AB RESP QL NAA+PROBE: NOT DETECTED
SODIUM SERPL-SCNC: 139 MMOL/L (ref 136–145)
WBC # BLD AUTO: 14.87 10*3/MM3 (ref 3.4–10.8)

## 2021-05-24 PROCEDURE — 87147 CULTURE TYPE IMMUNOLOGIC: CPT | Performed by: SURGERY

## 2021-05-24 PROCEDURE — 96375 TX/PRO/DX INJ NEW DRUG ADDON: CPT

## 2021-05-24 PROCEDURE — 87205 SMEAR GRAM STAIN: CPT | Performed by: SURGERY

## 2021-05-24 PROCEDURE — G0378 HOSPITAL OBSERVATION PER HR: HCPCS

## 2021-05-24 PROCEDURE — 87186 SC STD MICRODIL/AGAR DIL: CPT | Performed by: SURGERY

## 2021-05-24 PROCEDURE — 80053 COMPREHEN METABOLIC PANEL: CPT | Performed by: SURGERY

## 2021-05-24 PROCEDURE — 85027 COMPLETE CBC AUTOMATED: CPT | Performed by: SURGERY

## 2021-05-24 PROCEDURE — 25010000002 PIPERACILLIN SOD-TAZOBACTAM PER 1 G: Performed by: SURGERY

## 2021-05-24 PROCEDURE — 96376 TX/PRO/DX INJ SAME DRUG ADON: CPT

## 2021-05-24 PROCEDURE — 87070 CULTURE OTHR SPECIMN AEROBIC: CPT | Performed by: SURGERY

## 2021-05-24 PROCEDURE — 25010000002 VANCOMYCIN 10 G RECONSTITUTED SOLUTION: Performed by: SURGERY

## 2021-05-24 PROCEDURE — 96361 HYDRATE IV INFUSION ADD-ON: CPT

## 2021-05-24 RX ORDER — SODIUM CHLORIDE 0.9 % (FLUSH) 0.9 %
10 SYRINGE (ML) INJECTION AS NEEDED
Status: DISCONTINUED | OUTPATIENT
Start: 2021-05-24 | End: 2021-05-26 | Stop reason: HOSPADM

## 2021-05-24 RX ORDER — HYDROCODONE BITARTRATE AND ACETAMINOPHEN 5; 325 MG/1; MG/1
1 TABLET ORAL EVERY 4 HOURS PRN
Status: DISCONTINUED | OUTPATIENT
Start: 2021-05-24 | End: 2021-05-26 | Stop reason: HOSPADM

## 2021-05-24 RX ORDER — ONDANSETRON 4 MG/1
4 TABLET, FILM COATED ORAL EVERY 6 HOURS PRN
Status: DISCONTINUED | OUTPATIENT
Start: 2021-05-24 | End: 2021-05-26 | Stop reason: HOSPADM

## 2021-05-24 RX ORDER — ZOLPIDEM TARTRATE 5 MG/1
10 TABLET ORAL NIGHTLY PRN
Status: DISCONTINUED | OUTPATIENT
Start: 2021-05-24 | End: 2021-05-26 | Stop reason: HOSPADM

## 2021-05-24 RX ORDER — NALOXONE HCL 0.4 MG/ML
0.4 VIAL (ML) INJECTION
Status: DISCONTINUED | OUTPATIENT
Start: 2021-05-24 | End: 2021-05-26 | Stop reason: HOSPADM

## 2021-05-24 RX ORDER — SODIUM CHLORIDE 9 MG/ML
100 INJECTION, SOLUTION INTRAVENOUS CONTINUOUS
Status: DISCONTINUED | OUTPATIENT
Start: 2021-05-24 | End: 2021-05-24

## 2021-05-24 RX ORDER — ESCITALOPRAM OXALATE 20 MG/1
20 TABLET ORAL DAILY
Status: DISCONTINUED | OUTPATIENT
Start: 2021-05-24 | End: 2021-05-26 | Stop reason: HOSPADM

## 2021-05-24 RX ORDER — HYDROCODONE BITARTRATE AND ACETAMINOPHEN 10; 325 MG/1; MG/1
1 TABLET ORAL EVERY 4 HOURS PRN
Status: DISCONTINUED | OUTPATIENT
Start: 2021-05-24 | End: 2021-05-26 | Stop reason: HOSPADM

## 2021-05-24 RX ORDER — VANCOMYCIN HYDROCHLORIDE 1 G/200ML
1000 INJECTION, SOLUTION INTRAVENOUS EVERY 12 HOURS
Status: DISCONTINUED | OUTPATIENT
Start: 2021-05-25 | End: 2021-05-26 | Stop reason: HOSPADM

## 2021-05-24 RX ORDER — SODIUM CHLORIDE 0.9 % (FLUSH) 0.9 %
10 SYRINGE (ML) INJECTION EVERY 12 HOURS SCHEDULED
Status: DISCONTINUED | OUTPATIENT
Start: 2021-05-24 | End: 2021-05-26 | Stop reason: HOSPADM

## 2021-05-24 RX ORDER — CETIRIZINE HYDROCHLORIDE 10 MG/1
10 TABLET ORAL DAILY
Status: DISCONTINUED | OUTPATIENT
Start: 2021-05-24 | End: 2021-05-26 | Stop reason: HOSPADM

## 2021-05-24 RX ORDER — ONDANSETRON 2 MG/ML
4 INJECTION INTRAMUSCULAR; INTRAVENOUS EVERY 6 HOURS PRN
Status: DISCONTINUED | OUTPATIENT
Start: 2021-05-24 | End: 2021-05-26 | Stop reason: HOSPADM

## 2021-05-24 RX ORDER — HYDROMORPHONE HYDROCHLORIDE 1 MG/ML
0.5 INJECTION, SOLUTION INTRAMUSCULAR; INTRAVENOUS; SUBCUTANEOUS
Status: DISCONTINUED | OUTPATIENT
Start: 2021-05-24 | End: 2021-05-26 | Stop reason: HOSPADM

## 2021-05-24 RX ORDER — FAMOTIDINE 10 MG/ML
20 INJECTION, SOLUTION INTRAVENOUS EVERY 12 HOURS SCHEDULED
Status: DISCONTINUED | OUTPATIENT
Start: 2021-05-24 | End: 2021-05-26 | Stop reason: HOSPADM

## 2021-05-24 RX ADMIN — SODIUM CHLORIDE, PRESERVATIVE FREE 10 ML: 5 INJECTION INTRAVENOUS at 20:21

## 2021-05-24 RX ADMIN — SODIUM CHLORIDE 100 ML/HR: 9 INJECTION, SOLUTION INTRAVENOUS at 02:13

## 2021-05-24 RX ADMIN — HYDROCODONE BITARTRATE AND ACETAMINOPHEN 1 TABLET: 10; 325 TABLET ORAL at 20:21

## 2021-05-24 RX ADMIN — FAMOTIDINE 20 MG: 10 INJECTION INTRAVENOUS at 02:11

## 2021-05-24 RX ADMIN — HYDROCODONE BITARTRATE AND ACETAMINOPHEN 1 TABLET: 10; 325 TABLET ORAL at 07:06

## 2021-05-24 RX ADMIN — ESCITALOPRAM 20 MG: 20 TABLET, FILM COATED ORAL at 08:21

## 2021-05-24 RX ADMIN — HYDROCODONE BITARTRATE AND ACETAMINOPHEN 1 TABLET: 10; 325 TABLET ORAL at 16:39

## 2021-05-24 RX ADMIN — TAZOBACTAM SODIUM AND PIPERACILLIN SODIUM 3.38 G: 375; 3 INJECTION, SOLUTION INTRAVENOUS at 06:58

## 2021-05-24 RX ADMIN — FAMOTIDINE 20 MG: 10 INJECTION INTRAVENOUS at 16:39

## 2021-05-24 RX ADMIN — CETIRIZINE HYDROCHLORIDE ALLERGY 10 MG: 10 TABLET ORAL at 08:21

## 2021-05-24 RX ADMIN — HYDROCODONE BITARTRATE AND ACETAMINOPHEN 1 TABLET: 10; 325 TABLET ORAL at 00:54

## 2021-05-24 RX ADMIN — VANCOMYCIN HYDROCHLORIDE 1500 MG: 10 INJECTION, POWDER, LYOPHILIZED, FOR SOLUTION INTRAVENOUS at 18:15

## 2021-05-24 RX ADMIN — FAMOTIDINE 20 MG: 10 INJECTION INTRAVENOUS at 20:20

## 2021-05-24 RX ADMIN — SODIUM CHLORIDE, PRESERVATIVE FREE 10 ML: 5 INJECTION INTRAVENOUS at 02:12

## 2021-05-24 RX ADMIN — ZOLPIDEM TARTRATE 10 MG: 5 TABLET ORAL at 20:20

## 2021-05-24 RX ADMIN — HYDROCODONE BITARTRATE AND ACETAMINOPHEN 1 TABLET: 10; 325 TABLET ORAL at 12:02

## 2021-05-24 RX ADMIN — TAZOBACTAM SODIUM AND PIPERACILLIN SODIUM 3.38 G: 375; 3 INJECTION, SOLUTION INTRAVENOUS at 02:11

## 2021-05-24 RX ADMIN — ZOLPIDEM TARTRATE 10 MG: 5 TABLET ORAL at 00:58

## 2021-05-24 RX ADMIN — TAZOBACTAM SODIUM AND PIPERACILLIN SODIUM 3.38 G: 375; 3 INJECTION, SOLUTION INTRAVENOUS at 16:39

## 2021-05-25 PROCEDURE — 25010000002 PIPERACILLIN SOD-TAZOBACTAM PER 1 G: Performed by: SURGERY

## 2021-05-25 PROCEDURE — 25010000002 ONDANSETRON PER 1 MG: Performed by: SURGERY

## 2021-05-25 PROCEDURE — 99024 POSTOP FOLLOW-UP VISIT: CPT | Performed by: SURGERY

## 2021-05-25 PROCEDURE — 96366 THER/PROPH/DIAG IV INF ADDON: CPT

## 2021-05-25 PROCEDURE — 25010000002 VANCOMYCIN PER 500 MG: Performed by: SURGERY

## 2021-05-25 PROCEDURE — 96367 TX/PROPH/DG ADDL SEQ IV INF: CPT

## 2021-05-25 PROCEDURE — 63710000001 DIPHENHYDRAMINE PER 50 MG: Performed by: SURGERY

## 2021-05-25 PROCEDURE — 96376 TX/PRO/DX INJ SAME DRUG ADON: CPT

## 2021-05-25 PROCEDURE — G0378 HOSPITAL OBSERVATION PER HR: HCPCS

## 2021-05-25 PROCEDURE — 96368 THER/DIAG CONCURRENT INF: CPT

## 2021-05-25 RX ORDER — CETIRIZINE HYDROCHLORIDE 10 MG/1
10 TABLET ORAL DAILY
Status: DISCONTINUED | OUTPATIENT
Start: 2021-05-25 | End: 2021-05-25 | Stop reason: SDUPTHER

## 2021-05-25 RX ORDER — DIPHENHYDRAMINE HCL 50 MG
50 CAPSULE ORAL EVERY 6 HOURS PRN
Status: DISCONTINUED | OUTPATIENT
Start: 2021-05-25 | End: 2021-05-26 | Stop reason: HOSPADM

## 2021-05-25 RX ADMIN — HYDROCODONE BITARTRATE AND ACETAMINOPHEN 1 TABLET: 10; 325 TABLET ORAL at 00:36

## 2021-05-25 RX ADMIN — CETIRIZINE HYDROCHLORIDE ALLERGY 10 MG: 10 TABLET ORAL at 08:01

## 2021-05-25 RX ADMIN — ESCITALOPRAM 20 MG: 20 TABLET, FILM COATED ORAL at 08:01

## 2021-05-25 RX ADMIN — ONDANSETRON 4 MG: 2 INJECTION INTRAMUSCULAR; INTRAVENOUS at 09:26

## 2021-05-25 RX ADMIN — VANCOMYCIN HYDROCHLORIDE 1000 MG: 1 INJECTION, SOLUTION INTRAVENOUS at 18:53

## 2021-05-25 RX ADMIN — VANCOMYCIN HYDROCHLORIDE 1000 MG: 1 INJECTION, SOLUTION INTRAVENOUS at 05:27

## 2021-05-25 RX ADMIN — FAMOTIDINE 20 MG: 10 INJECTION INTRAVENOUS at 08:02

## 2021-05-25 RX ADMIN — HYDROCODONE BITARTRATE AND ACETAMINOPHEN 1 TABLET: 10; 325 TABLET ORAL at 05:26

## 2021-05-25 RX ADMIN — FAMOTIDINE 20 MG: 10 INJECTION INTRAVENOUS at 21:39

## 2021-05-25 RX ADMIN — HYDROCODONE BITARTRATE AND ACETAMINOPHEN 1 TABLET: 10; 325 TABLET ORAL at 10:58

## 2021-05-25 RX ADMIN — HYDROCODONE BITARTRATE AND ACETAMINOPHEN 1 TABLET: 10; 325 TABLET ORAL at 19:36

## 2021-05-25 RX ADMIN — ZOLPIDEM TARTRATE 10 MG: 5 TABLET ORAL at 21:38

## 2021-05-25 RX ADMIN — DIPHENHYDRAMINE HYDROCHLORIDE 50 MG: 50 CAPSULE ORAL at 19:36

## 2021-05-25 RX ADMIN — SODIUM CHLORIDE, PRESERVATIVE FREE 10 ML: 5 INJECTION INTRAVENOUS at 08:02

## 2021-05-25 RX ADMIN — TAZOBACTAM SODIUM AND PIPERACILLIN SODIUM 3.38 G: 375; 3 INJECTION, SOLUTION INTRAVENOUS at 14:46

## 2021-05-25 RX ADMIN — TAZOBACTAM SODIUM AND PIPERACILLIN SODIUM 3.38 G: 375; 3 INJECTION, SOLUTION INTRAVENOUS at 08:02

## 2021-05-25 RX ADMIN — HYDROCODONE BITARTRATE AND ACETAMINOPHEN 1 TABLET: 10; 325 TABLET ORAL at 14:59

## 2021-05-25 RX ADMIN — TAZOBACTAM SODIUM AND PIPERACILLIN SODIUM 3.38 G: 375; 3 INJECTION, SOLUTION INTRAVENOUS at 00:36

## 2021-05-25 RX ADMIN — SODIUM CHLORIDE, PRESERVATIVE FREE 10 ML: 5 INJECTION INTRAVENOUS at 21:39

## 2021-05-26 ENCOUNTER — READMISSION MANAGEMENT (OUTPATIENT)
Dept: CALL CENTER | Facility: HOSPITAL | Age: 46
End: 2021-05-26

## 2021-05-26 ENCOUNTER — APPOINTMENT (OUTPATIENT)
Dept: ULTRASOUND IMAGING | Facility: HOSPITAL | Age: 46
End: 2021-05-26

## 2021-05-26 ENCOUNTER — TELEPHONE (OUTPATIENT)
Dept: SURGERY | Facility: CLINIC | Age: 46
End: 2021-05-26

## 2021-05-26 VITALS
DIASTOLIC BLOOD PRESSURE: 65 MMHG | SYSTOLIC BLOOD PRESSURE: 107 MMHG | RESPIRATION RATE: 16 BRPM | HEART RATE: 58 BPM | OXYGEN SATURATION: 97 % | TEMPERATURE: 96.9 F

## 2021-05-26 DIAGNOSIS — N80.129 ENDOMETRIOMA: Primary | ICD-10-CM

## 2021-05-26 LAB
ANION GAP SERPL CALCULATED.3IONS-SCNC: 6.9 MMOL/L (ref 5–15)
BACTERIA SPEC AEROBE CULT: ABNORMAL
BUN SERPL-MCNC: 9 MG/DL (ref 6–20)
BUN/CREAT SERPL: 10.6 (ref 7–25)
CALCIUM SPEC-SCNC: 8.7 MG/DL (ref 8.6–10.5)
CHLORIDE SERPL-SCNC: 105 MMOL/L (ref 98–107)
CO2 SERPL-SCNC: 27.1 MMOL/L (ref 22–29)
CREAT SERPL-MCNC: 0.85 MG/DL (ref 0.57–1)
DEPRECATED RDW RBC AUTO: 42.3 FL (ref 37–54)
ERYTHROCYTE [DISTWIDTH] IN BLOOD BY AUTOMATED COUNT: 12.3 % (ref 12.3–15.4)
GFR SERPL CREATININE-BSD FRML MDRD: 72 ML/MIN/1.73
GLUCOSE SERPL-MCNC: 90 MG/DL (ref 65–99)
GRAM STN SPEC: ABNORMAL
HCT VFR BLD AUTO: 36.6 % (ref 34–46.6)
HGB BLD-MCNC: 12.3 G/DL (ref 12–15.9)
MCH RBC QN AUTO: 31.5 PG (ref 26.6–33)
MCHC RBC AUTO-ENTMCNC: 33.6 G/DL (ref 31.5–35.7)
MCV RBC AUTO: 93.8 FL (ref 79–97)
PLATELET # BLD AUTO: 282 10*3/MM3 (ref 140–450)
PMV BLD AUTO: 11.1 FL (ref 6–12)
POTASSIUM SERPL-SCNC: 4.1 MMOL/L (ref 3.5–5.2)
RBC # BLD AUTO: 3.9 10*6/MM3 (ref 3.77–5.28)
SODIUM SERPL-SCNC: 139 MMOL/L (ref 136–145)
WBC # BLD AUTO: 6.83 10*3/MM3 (ref 3.4–10.8)

## 2021-05-26 PROCEDURE — 85027 COMPLETE CBC AUTOMATED: CPT | Performed by: SURGERY

## 2021-05-26 PROCEDURE — 96376 TX/PRO/DX INJ SAME DRUG ADON: CPT

## 2021-05-26 PROCEDURE — 25010000002 VANCOMYCIN PER 500 MG: Performed by: SURGERY

## 2021-05-26 PROCEDURE — 25010000002 PIPERACILLIN SOD-TAZOBACTAM PER 1 G: Performed by: SURGERY

## 2021-05-26 PROCEDURE — 49406 IMAGE CATH FLUID PERI/RETRO: CPT

## 2021-05-26 PROCEDURE — G0378 HOSPITAL OBSERVATION PER HR: HCPCS

## 2021-05-26 PROCEDURE — 87147 CULTURE TYPE IMMUNOLOGIC: CPT | Performed by: RADIOLOGY

## 2021-05-26 PROCEDURE — 87070 CULTURE OTHR SPECIMN AEROBIC: CPT | Performed by: RADIOLOGY

## 2021-05-26 PROCEDURE — 75989 ABSCESS DRAINAGE UNDER X-RAY: CPT

## 2021-05-26 PROCEDURE — C1729 CATH, DRAINAGE: HCPCS

## 2021-05-26 PROCEDURE — 80048 BASIC METABOLIC PNL TOTAL CA: CPT | Performed by: SURGERY

## 2021-05-26 PROCEDURE — 96366 THER/PROPH/DIAG IV INF ADDON: CPT

## 2021-05-26 PROCEDURE — 99024 POSTOP FOLLOW-UP VISIT: CPT | Performed by: SURGERY

## 2021-05-26 PROCEDURE — 87186 SC STD MICRODIL/AGAR DIL: CPT | Performed by: RADIOLOGY

## 2021-05-26 PROCEDURE — 87205 SMEAR GRAM STAIN: CPT | Performed by: RADIOLOGY

## 2021-05-26 RX ORDER — LIDOCAINE HYDROCHLORIDE 10 MG/ML
10 INJECTION, SOLUTION INFILTRATION; PERINEURAL ONCE
Status: DISCONTINUED | OUTPATIENT
Start: 2021-05-26 | End: 2021-05-26 | Stop reason: HOSPADM

## 2021-05-26 RX ORDER — HYDROCODONE BITARTRATE AND ACETAMINOPHEN 5; 325 MG/1; MG/1
TABLET ORAL
Qty: 24 TABLET | Refills: 0 | Status: SHIPPED | OUTPATIENT
Start: 2021-05-26 | End: 2021-06-03

## 2021-05-26 RX ORDER — SULFAMETHOXAZOLE AND TRIMETHOPRIM 800; 160 MG/1; MG/1
1 TABLET ORAL 2 TIMES DAILY
Qty: 14 TABLET | Refills: 0 | Status: SHIPPED | OUTPATIENT
Start: 2021-05-26 | End: 2021-06-03

## 2021-05-26 RX ADMIN — TAZOBACTAM SODIUM AND PIPERACILLIN SODIUM 3.38 G: 375; 3 INJECTION, SOLUTION INTRAVENOUS at 08:02

## 2021-05-26 RX ADMIN — HYDROCODONE BITARTRATE AND ACETAMINOPHEN 1 TABLET: 10; 325 TABLET ORAL at 01:20

## 2021-05-26 RX ADMIN — HYDROCODONE BITARTRATE AND ACETAMINOPHEN 1 TABLET: 10; 325 TABLET ORAL at 10:54

## 2021-05-26 RX ADMIN — CETIRIZINE HYDROCHLORIDE ALLERGY 10 MG: 10 TABLET ORAL at 08:05

## 2021-05-26 RX ADMIN — FAMOTIDINE 20 MG: 10 INJECTION INTRAVENOUS at 08:06

## 2021-05-26 RX ADMIN — VANCOMYCIN HYDROCHLORIDE 1000 MG: 1 INJECTION, SOLUTION INTRAVENOUS at 06:26

## 2021-05-26 RX ADMIN — TAZOBACTAM SODIUM AND PIPERACILLIN SODIUM 3.38 G: 375; 3 INJECTION, SOLUTION INTRAVENOUS at 01:18

## 2021-05-26 RX ADMIN — ESCITALOPRAM 20 MG: 20 TABLET, FILM COATED ORAL at 08:06

## 2021-05-26 RX ADMIN — HYDROCODONE BITARTRATE AND ACETAMINOPHEN 1 TABLET: 10; 325 TABLET ORAL at 06:26

## 2021-05-26 NOTE — OUTREACH NOTE
Prep Survey      Responses   [facility] patient discharged from?  [place]   Is LACE score < 7 ?  Yes   Emergency Room discharge w/ pulse ox?  No   Eligibility  [facility]   Date of Admission  05/23/21   Date of Discharge  05/26/21   Discharge Disposition  Home or Self Care   Discharge diagnosis  Postoperative cellulitis and fluid collection (underwent excision of subfascial intramuscular tumor abdominal wall on 5/14/2021)   Does the patient have one of the following disease processes/diagnoses(primary or secondary)?  Other   Does the patient have Home health ordered?  No   Is there a DME ordered?  No   Prep survey completed?  Yes          Anai Perea RN

## 2021-05-27 ENCOUNTER — TRANSITIONAL CARE MANAGEMENT TELEPHONE ENCOUNTER (OUTPATIENT)
Dept: CALL CENTER | Facility: HOSPITAL | Age: 46
End: 2021-05-27

## 2021-05-27 NOTE — OUTREACH NOTE
Call Center TCM Note      Responses   [facility] patient discharged from?  [place]   Does the patient have one of the following disease processes/diagnoses(primary or secondary)?  Other   TCM attempt successful?  Yes   Discharge diagnosis  Postoperative cellulitis and fluid collection (underwent excision of subfascial intramuscular tumor abdominal wall on 5/14/2021)   Meds reviewed with patient/caregiver?  Yes   Is the patient having any side effects they believe may be caused by any medication additions or changes?  No   Does the patient have all medications ordered at discharge?  Yes   Is the patient taking all medications as directed (includes completed medication regime)?  Yes   Does the patient have a primary care provider?   Yes   Does the patient have an appointment with their PCP within 7 days of discharge?  No   Comments regarding PCP  Pt declines TCM FWP with PCP Desirae POWERS. States she sees PCP very infrequently and feels this is more surgical and GYN issue.    Has the patient kept scheduled appointments due by today?  N/A   Has home health visited the patient within 72 hours of discharge?  N/A   Psychosocial issues?  No   Did the patient receive a copy of their discharge instructions?  Yes   Nursing interventions  Reviewed instructions with patient   What is the patient's perception of their health status since discharge?  Improving   Is the patient/caregiver able to teach back signs and symptoms related to disease process for when to call PCP?  Yes   Is the patient/caregiver able to teach back signs and symptoms related to disease process for when to call 911?  Yes   Is the patient/caregiver able to teach back the hierarchy of who to call/visit for symptoms/problems? PCP, Specialist, Home health nurse, Urgent Care, ED, 911  Yes   If the patient is a current smoker, are they able to teach back resources for cessation?  Not a smoker   TCM call completed?  Yes   Wrap up additional  comments  Pt believes she is improving, redness of abdomen better as is pain. Pt managing drain without issue. Pt GYN saw her today, and she will fwp with Dr Chavez on Saturday for drain removal. Pt declines TCM FWP with PCP Desirae POWERS. States she sees PCP very infrequently and feels this is more surgical and GYN issue.           Julianna Ellis MA    5/27/2021, 13:13 EDT

## 2021-05-28 LAB
BACTERIA FLD CULT: ABNORMAL
BACTERIA SPEC AEROBE CULT: NORMAL
BACTERIA SPEC AEROBE CULT: NORMAL
GRAM STN SPEC: ABNORMAL
GRAM STN SPEC: ABNORMAL

## 2021-06-01 ENCOUNTER — TELEPHONE (OUTPATIENT)
Dept: SURGERY | Facility: CLINIC | Age: 46
End: 2021-06-01

## 2021-06-01 NOTE — TELEPHONE ENCOUNTER
Patient called stating she developed some swelling after having her drain removed this weekend. No fever and not having any significant pain. She thinks she is okay but asked if she should be seen this week. Do you want to see or have pt keep an eye on it and call if it worsens?

## 2021-06-03 ENCOUNTER — OFFICE VISIT (OUTPATIENT)
Dept: SURGERY | Facility: CLINIC | Age: 46
End: 2021-06-03

## 2021-06-03 DIAGNOSIS — Z09 FOLLOW UP: Primary | ICD-10-CM

## 2021-06-03 PROCEDURE — 99024 POSTOP FOLLOW-UP VISIT: CPT | Performed by: SURGERY

## 2021-06-03 NOTE — PROGRESS NOTES
Returned today with recurrent swelling in the surgical site.  No erythema or other evidence of infection.  Skin remains healed/intact.    Skin was prepped with alcohol and 1% lidocaine with epinephrine infiltrated.  18-gauge needle inserted and 20 mL serous fluid aspirated with resolution.  Return in 1 week to check again

## 2021-06-10 ENCOUNTER — OFFICE VISIT (OUTPATIENT)
Dept: SURGERY | Facility: CLINIC | Age: 46
End: 2021-06-10

## 2021-06-10 VITALS — HEIGHT: 68 IN | BODY MASS INDEX: 25.76 KG/M2 | WEIGHT: 169.97 LBS

## 2021-06-10 DIAGNOSIS — Z09 FOLLOW UP: Primary | ICD-10-CM

## 2021-06-10 PROCEDURE — 99024 POSTOP FOLLOW-UP VISIT: CPT | Performed by: SURGERY

## 2021-06-10 RX ORDER — DICLOXACILLIN SODIUM 250 MG/1
250 CAPSULE ORAL 4 TIMES DAILY
Qty: 40 CAPSULE | Refills: 0 | Status: SHIPPED | OUTPATIENT
Start: 2021-06-10 | End: 2021-06-20

## 2021-06-17 ENCOUNTER — OFFICE VISIT (OUTPATIENT)
Dept: SURGERY | Facility: CLINIC | Age: 46
End: 2021-06-17

## 2021-06-17 VITALS — WEIGHT: 170 LBS | BODY MASS INDEX: 25.76 KG/M2 | HEIGHT: 68 IN

## 2021-06-17 DIAGNOSIS — Z09 FOLLOW UP: Primary | ICD-10-CM

## 2021-06-17 PROCEDURE — 99024 POSTOP FOLLOW-UP VISIT: CPT | Performed by: SURGERY

## 2021-06-17 NOTE — PROGRESS NOTES
Incision completely healed.  No erythema.  No fluctuance.  No fluid evident.  Normal postoperative induration.  Activity restrictions discussed.  Follow-up as needed.

## 2022-11-29 ENCOUNTER — OFFICE VISIT (OUTPATIENT)
Dept: SURGERY | Facility: CLINIC | Age: 47
End: 2022-11-29

## 2022-11-29 ENCOUNTER — HOSPITAL ENCOUNTER (OUTPATIENT)
Dept: CT IMAGING | Facility: HOSPITAL | Age: 47
Setting detail: OBSERVATION
Discharge: HOME | End: 2022-11-30
Attending: SURGERY | Admitting: SURGERY

## 2022-11-29 VITALS — WEIGHT: 168 LBS | HEIGHT: 67 IN | BODY MASS INDEX: 26.37 KG/M2

## 2022-11-29 DIAGNOSIS — R10.31 RIGHT LOWER QUADRANT ABDOMINAL PAIN: ICD-10-CM

## 2022-11-29 DIAGNOSIS — R10.31 RIGHT LOWER QUADRANT ABDOMINAL PAIN: Primary | ICD-10-CM

## 2022-11-29 LAB
ALBUMIN SERPL-MCNC: 3.8 G/DL (ref 3.5–5.2)
ALBUMIN/GLOB SERPL: 1.5 G/DL
ALP SERPL-CCNC: 51 U/L (ref 39–117)
ALT SERPL W P-5'-P-CCNC: 9 U/L (ref 1–33)
ANION GAP SERPL CALCULATED.3IONS-SCNC: 6 MMOL/L (ref 5–15)
AST SERPL-CCNC: 14 U/L (ref 1–32)
BASOPHILS # BLD AUTO: 0.03 10*3/MM3 (ref 0–0.2)
BASOPHILS NFR BLD AUTO: 0.4 % (ref 0–1.5)
BILIRUB SERPL-MCNC: 1.1 MG/DL (ref 0–1.2)
BUN SERPL-MCNC: 10 MG/DL (ref 6–20)
BUN/CREAT SERPL: 11.2 (ref 7–25)
CALCIUM SPEC-SCNC: 9.1 MG/DL (ref 8.6–10.5)
CHLORIDE SERPL-SCNC: 102 MMOL/L (ref 98–107)
CO2 SERPL-SCNC: 30 MMOL/L (ref 22–29)
CREAT SERPL-MCNC: 0.89 MG/DL (ref 0.57–1)
DEPRECATED RDW RBC AUTO: 42.9 FL (ref 37–54)
EGFRCR SERPLBLD CKD-EPI 2021: 80.6 ML/MIN/1.73
EOSINOPHIL # BLD AUTO: 0.14 10*3/MM3 (ref 0–0.4)
EOSINOPHIL NFR BLD AUTO: 1.7 % (ref 0.3–6.2)
ERYTHROCYTE [DISTWIDTH] IN BLOOD BY AUTOMATED COUNT: 12 % (ref 12.3–15.4)
GLOBULIN UR ELPH-MCNC: 2.6 GM/DL
GLUCOSE SERPL-MCNC: 90 MG/DL (ref 65–99)
HCT VFR BLD AUTO: 38.5 % (ref 34–46.6)
HGB BLD-MCNC: 12.7 G/DL (ref 12–15.9)
IMM GRANULOCYTES # BLD AUTO: 0.03 10*3/MM3 (ref 0–0.05)
IMM GRANULOCYTES NFR BLD AUTO: 0.4 % (ref 0–0.5)
LIPASE SERPL-CCNC: 32 U/L (ref 13–60)
LYMPHOCYTES # BLD AUTO: 0.92 10*3/MM3 (ref 0.7–3.1)
LYMPHOCYTES NFR BLD AUTO: 10.9 % (ref 19.6–45.3)
MCH RBC QN AUTO: 32.1 PG (ref 26.6–33)
MCHC RBC AUTO-ENTMCNC: 33 G/DL (ref 31.5–35.7)
MCV RBC AUTO: 97.2 FL (ref 79–97)
MONOCYTES # BLD AUTO: 0.48 10*3/MM3 (ref 0.1–0.9)
MONOCYTES NFR BLD AUTO: 5.7 % (ref 5–12)
NEUTROPHILS NFR BLD AUTO: 6.83 10*3/MM3 (ref 1.7–7)
NEUTROPHILS NFR BLD AUTO: 80.9 % (ref 42.7–76)
NRBC BLD AUTO-RTO: 0 /100 WBC (ref 0–0.2)
PLATELET # BLD AUTO: 165 10*3/MM3 (ref 140–450)
PMV BLD AUTO: 11.4 FL (ref 6–12)
POTASSIUM SERPL-SCNC: 4 MMOL/L (ref 3.5–5.2)
PROT SERPL-MCNC: 6.4 G/DL (ref 6–8.5)
RBC # BLD AUTO: 3.96 10*6/MM3 (ref 3.77–5.28)
SODIUM SERPL-SCNC: 138 MMOL/L (ref 136–145)
WBC NRBC COR # BLD: 8.43 10*3/MM3 (ref 3.4–10.8)

## 2022-11-29 PROCEDURE — G0378 HOSPITAL OBSERVATION PER HR: HCPCS

## 2022-11-29 PROCEDURE — 74177 CT ABD & PELVIS W/CONTRAST: CPT

## 2022-11-29 PROCEDURE — 83690 ASSAY OF LIPASE: CPT | Performed by: SURGERY

## 2022-11-29 PROCEDURE — 96360 HYDRATION IV INFUSION INIT: CPT

## 2022-11-29 PROCEDURE — 99218 PR INITIAL OBSERVATION CARE/DAY 30 MINUTES: CPT | Performed by: SURGERY

## 2022-11-29 PROCEDURE — 80053 COMPREHEN METABOLIC PANEL: CPT | Performed by: SURGERY

## 2022-11-29 PROCEDURE — 85025 COMPLETE CBC W/AUTO DIFF WBC: CPT | Performed by: SURGERY

## 2022-11-29 PROCEDURE — 25010000002 IOPAMIDOL 61 % SOLUTION: Performed by: SURGERY

## 2022-11-29 PROCEDURE — 96361 HYDRATE IV INFUSION ADD-ON: CPT

## 2022-11-29 RX ORDER — ESCITALOPRAM OXALATE 20 MG/1
20 TABLET ORAL DAILY
Status: DISCONTINUED | OUTPATIENT
Start: 2022-11-29 | End: 2022-11-30 | Stop reason: HOSPADM

## 2022-11-29 RX ORDER — ALPRAZOLAM 0.25 MG/1
0.25 TABLET ORAL AS NEEDED
Status: DISCONTINUED | OUTPATIENT
Start: 2022-11-29 | End: 2022-11-30 | Stop reason: HOSPADM

## 2022-11-29 RX ORDER — DOCUSATE SODIUM 100 MG/1
100 CAPSULE, LIQUID FILLED ORAL 2 TIMES DAILY
Status: DISCONTINUED | OUTPATIENT
Start: 2022-11-29 | End: 2022-11-30 | Stop reason: HOSPADM

## 2022-11-29 RX ORDER — SODIUM CHLORIDE 0.9 % (FLUSH) 0.9 %
1-10 SYRINGE (ML) INJECTION AS NEEDED
Status: DISCONTINUED | OUTPATIENT
Start: 2022-11-29 | End: 2022-11-30 | Stop reason: HOSPADM

## 2022-11-29 RX ORDER — SODIUM CHLORIDE, SODIUM LACTATE, POTASSIUM CHLORIDE, CALCIUM CHLORIDE 600; 310; 30; 20 MG/100ML; MG/100ML; MG/100ML; MG/100ML
150 INJECTION, SOLUTION INTRAVENOUS CONTINUOUS
Status: DISCONTINUED | OUTPATIENT
Start: 2022-11-29 | End: 2022-11-30

## 2022-11-29 RX ORDER — SODIUM CHLORIDE 0.9 % (FLUSH) 0.9 %
10 SYRINGE (ML) INJECTION EVERY 12 HOURS SCHEDULED
Status: DISCONTINUED | OUTPATIENT
Start: 2022-11-29 | End: 2022-11-30 | Stop reason: HOSPADM

## 2022-11-29 RX ORDER — NALOXONE HCL 0.4 MG/ML
0.4 VIAL (ML) INJECTION
Status: DISCONTINUED | OUTPATIENT
Start: 2022-11-29 | End: 2022-11-30 | Stop reason: HOSPADM

## 2022-11-29 RX ORDER — ZOLPIDEM TARTRATE 5 MG/1
5 TABLET ORAL NIGHTLY PRN
Status: DISCONTINUED | OUTPATIENT
Start: 2022-11-29 | End: 2022-11-30 | Stop reason: HOSPADM

## 2022-11-29 RX ORDER — ONDANSETRON 2 MG/ML
4 INJECTION INTRAMUSCULAR; INTRAVENOUS EVERY 6 HOURS PRN
Status: DISCONTINUED | OUTPATIENT
Start: 2022-11-29 | End: 2022-11-30 | Stop reason: HOSPADM

## 2022-11-29 RX ORDER — ALPRAZOLAM 0.25 MG/1
0.25 TABLET ORAL AS NEEDED
COMMUNITY

## 2022-11-29 RX ORDER — HYDROMORPHONE HYDROCHLORIDE 1 MG/ML
0.5 INJECTION, SOLUTION INTRAMUSCULAR; INTRAVENOUS; SUBCUTANEOUS
Status: DISCONTINUED | OUTPATIENT
Start: 2022-11-29 | End: 2022-11-30 | Stop reason: HOSPADM

## 2022-11-29 RX ORDER — SODIUM CHLORIDE 9 MG/ML
40 INJECTION, SOLUTION INTRAVENOUS AS NEEDED
Status: DISCONTINUED | OUTPATIENT
Start: 2022-11-29 | End: 2022-11-30 | Stop reason: HOSPADM

## 2022-11-29 RX ADMIN — ZOLPIDEM TARTRATE 5 MG: 5 TABLET ORAL at 21:34

## 2022-11-29 RX ADMIN — SODIUM CHLORIDE, POTASSIUM CHLORIDE, SODIUM LACTATE AND CALCIUM CHLORIDE 150 ML/HR: 600; 310; 30; 20 INJECTION, SOLUTION INTRAVENOUS at 21:34

## 2022-11-29 RX ADMIN — SODIUM CHLORIDE, POTASSIUM CHLORIDE, SODIUM LACTATE AND CALCIUM CHLORIDE 1000 ML: 600; 310; 30; 20 INJECTION, SOLUTION INTRAVENOUS at 18:47

## 2022-11-29 RX ADMIN — Medication 10 ML: at 20:23

## 2022-11-29 RX ADMIN — DOCUSATE SODIUM 100 MG: 100 CAPSULE, LIQUID FILLED ORAL at 20:23

## 2022-11-29 RX ADMIN — IOPAMIDOL 100 ML: 612 INJECTION, SOLUTION INTRAVENOUS at 16:45

## 2022-11-29 NOTE — PROGRESS NOTES
Subjective   Sil Ly is a 47 y.o. female who presents to the office in surgical consultation from Desirae Gonzalez APRN for abdominal pain.    History of Present Illness     The patient was in her normal state of health until yesterday when she developed abdominal pain.  She noticed the pain in the morning as a mild mid abdominal pain that was not well localized.  She went to do her regular workout but the pain made her alter her exercises until she had to stop working out.  Over the course of the day the pain became increasingly severe and seem to involve the entire abdomen.  She has mild nausea but no vomiting.  She has been constipated which is not normal for her.  Her appetite has been decreased and her water intake is decreased.  She has noticed her urine increasingly concentrated.  The pain was severe enough last night that she wanted to go to the emergency room but the wait times were more than 8 hours and she stayed at home.  She presents to our office because there is no relief in the pain.  On the way to the office, she states that every bump in the road increased her pain.    Review of Systems   Constitutional: Negative for fatigue and fever.   Respiratory: Negative for chest tightness and shortness of breath.    Cardiovascular: Negative for chest pain and palpitations.   Gastrointestinal: Positive for abdominal pain and nausea. Negative for blood in stool, constipation, diarrhea and vomiting.     Past Medical History:   Diagnosis Date   • ADHD    • Anxiety     no medications in pregnancy   • Arthritis     HIPS   • Clinical diagnosis of COVID-19 2020    [facility] [address]   • Hx of eczema    • Postoperative wound infection 20   • Seasonal allergies    • Soft tissue mass     ABDOMEN     Past Surgical History:   Procedure Laterality Date   • ABDOMINAL SURGERY     • BUNIONECTOMY Right 2013   •  SECTION N/A 2017    Procedure:  SECTION PRIMARY;  Surgeon: Montserrat BAL  MD Juan Carlos;  Location: [facility] LABOR DELIVERY;  Service:    •  SECTION N/A    • COLONOSCOPY     • EXCISION MASS TRUNK N/A 2021    Procedure: ABDOMINAL WALL MASS SOFT TISSUE EXCISION;  Surgeon: Aryan Chavez MD;  [place] [facility] [place] [facility]  Service: General;  Laterality: N/A;   • JOINT REPLACEMENT     • TOTAL HIP ARTHROPLASTY Bilateral 2018   • WISDOM TOOTH EXTRACTION       Family History   Problem Relation Age of Onset   • Melanoma Father    • Hypertension Father    • Cancer Father         Melanoma   • Depression Mother         In and out, on medicarion   • Basal cell carcinoma Brother    • Mental illness Brother         Bi Polar   • Malig Hyperthermia Neg Hx      Social History     Socioeconomic History   • Marital status:    Tobacco Use   • Smoking status: Never   • Smokeless tobacco: Never   • Tobacco comments:     Smoke when drinkkng with friends occasionally   Vaping Use   • Vaping Use: Never used   Substance and Sexual Activity   • Alcohol use: Yes     Alcohol/week: 4.0 standard drinks     Types: 2 Glasses of wine, 2 Cans of beer per week     Comment: socially    • Drug use: No   • Sexual activity: Yes     Partners: Female     Birth control/protection: Same-sex partner       Objective   Physical Exam  Constitutional:       Appearance: She is not ill-appearing or toxic-appearing.   Abdominal:      Palpations: Abdomen is soft.      Tenderness: There is generalized abdominal tenderness and tenderness in the right lower quadrant.      Comments: The abdomen is soft and mildly tender diffusely.  It is moderately tender in the right lower quadrant where there is localized guarding.   Neurological:      Mental Status: She is alert.   Psychiatric:         Behavior: Behavior is cooperative.         Assessment & Plan     1.  Abdominal pain: The patient has abdominal pain that was generalized and seems to be more located in the right lower quadrant today.  She is mildly tender diffusely and  moderately tender in the right lower quadrant.  Her symptoms are highly suggestive for acute appendicitis.  This was discussed with the patient and her father.  She will be sent over for a stat CT scan of the abdomen and pelvis.

## 2022-11-30 ENCOUNTER — READMISSION MANAGEMENT (OUTPATIENT)
Dept: CALL CENTER | Facility: HOSPITAL | Age: 47
End: 2022-11-30

## 2022-11-30 VITALS
SYSTOLIC BLOOD PRESSURE: 120 MMHG | BODY MASS INDEX: 26.37 KG/M2 | RESPIRATION RATE: 16 BRPM | OXYGEN SATURATION: 98 % | HEIGHT: 67 IN | DIASTOLIC BLOOD PRESSURE: 61 MMHG | TEMPERATURE: 97.8 F | WEIGHT: 168 LBS | HEART RATE: 61 BPM

## 2022-11-30 PROCEDURE — G0378 HOSPITAL OBSERVATION PER HR: HCPCS

## 2022-11-30 PROCEDURE — 96361 HYDRATE IV INFUSION ADD-ON: CPT

## 2022-11-30 RX ADMIN — SODIUM CHLORIDE, POTASSIUM CHLORIDE, SODIUM LACTATE AND CALCIUM CHLORIDE 150 ML/HR: 600; 310; 30; 20 INJECTION, SOLUTION INTRAVENOUS at 02:35

## 2022-11-30 RX ADMIN — DOCUSATE SODIUM 100 MG: 100 CAPSULE, LIQUID FILLED ORAL at 08:46

## 2022-11-30 NOTE — OUTREACH NOTE
Prep Survey    Flowsheet Row Responses   [facility] [place]   Is LACE score < 7 ? Yes   Emergency Room discharge w/ pulse ox? No   Eligibility [facility]   Date of Admission 11/29/22   Date of Discharge 11/30/22   Discharge Disposition Home or Self Care   Discharge diagnosis Right lower quadrant abdominal pain   Does the patient have one of the following disease processes/diagnoses(primary or secondary)? Other   Does the patient have Home health ordered? No   Is there a DME ordered? No   Prep survey completed? Yes          EDIS HOWARD - Registered Nurse

## 2022-12-01 ENCOUNTER — TRANSITIONAL CARE MANAGEMENT TELEPHONE ENCOUNTER (OUTPATIENT)
Dept: CALL CENTER | Facility: HOSPITAL | Age: 47
End: 2022-12-01

## 2022-12-01 NOTE — OUTREACH NOTE
Call Center TCM Note    Flowsheet Row Responses   [facility] patient discharged from? [place]   Does the patient have one of the following disease processes/diagnoses(primary or secondary)? Other   TCM attempt successful? Yes   Call start time 1629   Call end time 1631   Discharge diagnosis Right lower quadrant abdominal pain   Meds reviewed with patient/caregiver? Yes   Is the patient having any side effects they believe may be caused by any medication additions or changes? No   Does the patient have all medications ordered at discharge? N/A   Is the patient taking all medications as directed (includes completed medication regime)? Yes   Medication comments States new meds being ordered by GYN   Comments She feels her GYN can handle the issues at hand. TCM call complete.   Does the patient have an appointment with their PCP within 7 days of discharge? No appointments available   Nursing Interventions Patient declined scheduling/rescheduling appointment at this time   Has home health visited the patient within 72 hours of discharge? N/A   Psychosocial issues? No   Did the patient receive a copy of their discharge instructions? Yes   Nursing interventions Reviewed instructions with patient   What is the patient's perception of their health status since discharge? Improving   Is the patient/caregiver able to teach back signs and symptoms related to disease process for when to call PCP? Yes   Is the patient/caregiver able to teach back signs and symptoms related to disease process for when to call 911? Yes   Is the patient/caregiver able to teach back the hierarchy of who to call/visit for symptoms/problems? PCP, Specialist, Home health nurse, Urgent Care, ED, 911 Yes   If the patient is a current smoker, are they able to teach back resources for cessation? Not a smoker   Additional teach back comments GYN appt tomorrow and feels this relates to those issues as previously.   TCM call completed? Yes   Wrap up  additional comments Denies any needs or questions at this time.   Call end time 1631   Would this patient benefit from a Referral to [facility] Social Work? No   Is the patient interested in additional calls from an ambulatory ?  NOTE:  applies to high risk patients requiring additional follow-up. No          Randi Boothe RN    12/1/2022, 16:32 EST

## 2025-04-26 ENCOUNTER — APPOINTMENT (OUTPATIENT)
Dept: CT IMAGING | Facility: HOSPITAL | Age: 50
End: 2025-04-26
Payer: COMMERCIAL

## 2025-04-26 ENCOUNTER — HOSPITAL ENCOUNTER (EMERGENCY)
Facility: HOSPITAL | Age: 50
Discharge: HOME | End: 2025-04-26
Attending: EMERGENCY MEDICINE
Payer: COMMERCIAL

## 2025-04-26 VITALS
TEMPERATURE: 98.6 F | HEART RATE: 70 BPM | RESPIRATION RATE: 16 BRPM | SYSTOLIC BLOOD PRESSURE: 128 MMHG | DIASTOLIC BLOOD PRESSURE: 78 MMHG | OXYGEN SATURATION: 98 %

## 2025-04-26 DIAGNOSIS — S76.302A LEFT HAMSTRING INJURY, INITIAL ENCOUNTER: ICD-10-CM

## 2025-04-26 DIAGNOSIS — S70.12XA HEMATOMA OF LEFT THIGH, INITIAL ENCOUNTER: Primary | ICD-10-CM

## 2025-04-26 LAB
ALBUMIN SERPL-MCNC: 3.6 G/DL (ref 3.5–5.2)
ALBUMIN/GLOB SERPL: 1.4 G/DL
ALP SERPL-CCNC: 65 U/L (ref 39–117)
ALT SERPL W P-5'-P-CCNC: 22 U/L (ref 1–33)
ANION GAP SERPL CALCULATED.3IONS-SCNC: 11 MMOL/L (ref 5–15)
APTT PPP: 24.9 SECONDS (ref 22.7–35.4)
AST SERPL-CCNC: 18 U/L (ref 1–32)
BASOPHILS # BLD AUTO: 0.04 10*3/MM3 (ref 0–0.2)
BASOPHILS NFR BLD AUTO: 0.4 % (ref 0–1.5)
BILIRUB SERPL-MCNC: 0.6 MG/DL (ref 0–1.2)
BUN SERPL-MCNC: 12 MG/DL (ref 6–20)
BUN/CREAT SERPL: 20 (ref 7–25)
CALCIUM SPEC-SCNC: 8.9 MG/DL (ref 8.6–10.5)
CHLORIDE SERPL-SCNC: 105 MMOL/L (ref 98–107)
CK SERPL-CCNC: 57 U/L (ref 20–180)
CO2 SERPL-SCNC: 22 MMOL/L (ref 22–29)
CREAT SERPL-MCNC: 0.6 MG/DL (ref 0.57–1)
DEPRECATED RDW RBC AUTO: 44.3 FL (ref 37–54)
EGFRCR SERPLBLD CKD-EPI 2021: 110.2 ML/MIN/1.73
EOSINOPHIL # BLD AUTO: 0.09 10*3/MM3 (ref 0–0.4)
EOSINOPHIL NFR BLD AUTO: 1 % (ref 0.3–6.2)
ERYTHROCYTE [DISTWIDTH] IN BLOOD BY AUTOMATED COUNT: 12.1 % (ref 12.3–15.4)
GLOBULIN UR ELPH-MCNC: 2.5 GM/DL
GLUCOSE SERPL-MCNC: 98 MG/DL (ref 65–99)
HCT VFR BLD AUTO: 35.5 % (ref 34–46.6)
HGB BLD-MCNC: 12 G/DL (ref 12–15.9)
IMM GRANULOCYTES # BLD AUTO: 0.03 10*3/MM3 (ref 0–0.05)
IMM GRANULOCYTES NFR BLD AUTO: 0.3 % (ref 0–0.5)
INR PPP: 1.02 (ref 0.9–1.1)
LYMPHOCYTES # BLD AUTO: 1.01 10*3/MM3 (ref 0.7–3.1)
LYMPHOCYTES NFR BLD AUTO: 11.1 % (ref 19.6–45.3)
MCH RBC QN AUTO: 33.2 PG (ref 26.6–33)
MCHC RBC AUTO-ENTMCNC: 33.8 G/DL (ref 31.5–35.7)
MCV RBC AUTO: 98.3 FL (ref 79–97)
MONOCYTES # BLD AUTO: 0.53 10*3/MM3 (ref 0.1–0.9)
MONOCYTES NFR BLD AUTO: 5.8 % (ref 5–12)
NEUTROPHILS NFR BLD AUTO: 7.44 10*3/MM3 (ref 1.7–7)
NEUTROPHILS NFR BLD AUTO: 81.4 % (ref 42.7–76)
NRBC BLD AUTO-RTO: 0 /100 WBC (ref 0–0.2)
PLATELET # BLD AUTO: 297 10*3/MM3 (ref 140–450)
PMV BLD AUTO: 10.6 FL (ref 6–12)
POTASSIUM SERPL-SCNC: 4 MMOL/L (ref 3.5–5.2)
PROT SERPL-MCNC: 6.1 G/DL (ref 6–8.5)
PROTHROMBIN TIME: 13.3 SECONDS (ref 11.7–14.2)
RBC # BLD AUTO: 3.61 10*6/MM3 (ref 3.77–5.28)
SODIUM SERPL-SCNC: 138 MMOL/L (ref 136–145)
WBC NRBC COR # BLD AUTO: 9.14 10*3/MM3 (ref 3.4–10.8)

## 2025-04-26 PROCEDURE — 73701 CT LOWER EXTREMITY W/DYE: CPT

## 2025-04-26 PROCEDURE — 25010000002 MORPHINE PER 10 MG: Performed by: EMERGENCY MEDICINE

## 2025-04-26 PROCEDURE — 99285 EMERGENCY DEPT VISIT HI MDM: CPT

## 2025-04-26 PROCEDURE — 85610 PROTHROMBIN TIME: CPT | Performed by: EMERGENCY MEDICINE

## 2025-04-26 PROCEDURE — 85730 THROMBOPLASTIN TIME PARTIAL: CPT | Performed by: EMERGENCY MEDICINE

## 2025-04-26 PROCEDURE — 85025 COMPLETE CBC W/AUTO DIFF WBC: CPT | Performed by: EMERGENCY MEDICINE

## 2025-04-26 PROCEDURE — 25010000002 ONDANSETRON PER 1 MG: Performed by: EMERGENCY MEDICINE

## 2025-04-26 PROCEDURE — 82550 ASSAY OF CK (CPK): CPT | Performed by: EMERGENCY MEDICINE

## 2025-04-26 PROCEDURE — 25810000003 SODIUM CHLORIDE 0.9 % SOLUTION: Performed by: EMERGENCY MEDICINE

## 2025-04-26 PROCEDURE — 96375 TX/PRO/DX INJ NEW DRUG ADDON: CPT

## 2025-04-26 PROCEDURE — 25510000001 IOPAMIDOL 61 % SOLUTION: Performed by: EMERGENCY MEDICINE

## 2025-04-26 PROCEDURE — 80053 COMPREHEN METABOLIC PANEL: CPT | Performed by: EMERGENCY MEDICINE

## 2025-04-26 PROCEDURE — 96374 THER/PROPH/DIAG INJ IV PUSH: CPT

## 2025-04-26 RX ORDER — MORPHINE SULFATE 2 MG/ML
4 INJECTION, SOLUTION INTRAMUSCULAR; INTRAVENOUS ONCE
Status: COMPLETED | OUTPATIENT
Start: 2025-04-26 | End: 2025-04-26

## 2025-04-26 RX ORDER — HYDROCODONE BITARTRATE AND ACETAMINOPHEN 7.5; 325 MG/1; MG/1
1 TABLET ORAL EVERY 4 HOURS PRN
Qty: 16 TABLET | Refills: 0 | Status: SHIPPED | OUTPATIENT
Start: 2025-04-26

## 2025-04-26 RX ORDER — ONDANSETRON 2 MG/ML
4 INJECTION INTRAMUSCULAR; INTRAVENOUS ONCE
Status: COMPLETED | OUTPATIENT
Start: 2025-04-26 | End: 2025-04-26

## 2025-04-26 RX ORDER — IOPAMIDOL 612 MG/ML
100 INJECTION, SOLUTION INTRAVASCULAR
Status: COMPLETED | OUTPATIENT
Start: 2025-04-26 | End: 2025-04-26

## 2025-04-26 RX ADMIN — MORPHINE SULFATE 4 MG: 2 INJECTION, SOLUTION INTRAMUSCULAR; INTRAVENOUS at 11:23

## 2025-04-26 RX ADMIN — SODIUM CHLORIDE 1000 ML: 9 INJECTION, SOLUTION INTRAVENOUS at 11:19

## 2025-04-26 RX ADMIN — ONDANSETRON 4 MG: 2 INJECTION, SOLUTION INTRAMUSCULAR; INTRAVENOUS at 11:20

## 2025-04-26 RX ADMIN — IOPAMIDOL 85 ML: 612 INJECTION, SOLUTION INTRAVENOUS at 12:39

## 2025-04-26 NOTE — ED PROVIDER NOTES
EMERGENCY DEPARTMENT ENCOUNTER  Room Number:  40/40  PCP: Provider, No Known  Independent Historians: Patient  Date of encounter:  2025  Patient Care Team:  Provider, No Known as PCP - Maddi Soriano MA as Medical Assistant     HPI:  Chief Complaint: had concerns including Leg Injury.     A complete HPI/ROS/PMH/PSH/SH/FH are unobtainable due to: None    Chronic or social conditions impacting patient care (Social Determinants of Health): None    Context: Sil Ly is a 49 y.o. female who presents to the ED c/o acute left thigh pain.  Patient injured her left hamstring 1 week ago while playing football with her kids.  She slipped on some wet grass and felt a pop in that area.  She has a scheduled MRI next week.  She presents today with worsening bruising and swelling..  Intermittent paresthesias down the left leg but none currently.  She still able to bear weight.  She has been seeing Dr. Cuenca.  She is not on any anticoagulants.    PAST MEDICAL HISTORY  Active Ambulatory Problems     Diagnosis Date Noted    Pregnancy 2017    Anxiety 2014    Insomnia 2014    Left hip pain 2018    Primary osteoarthritis of both hips 08/10/2018    S/P bilateral hip replacements 2018    Endometrioma 2021    Abdominal wall cellulitis 2021    Right lower quadrant abdominal pain 2022     Resolved Ambulatory Problems     Diagnosis Date Noted    No Resolved Ambulatory Problems     Past Medical History:   Diagnosis Date    2015    Arthritis     Clinical diagnosis of COVID-19 2020    Hx of eczema     Postoperative wound infection 20    Seasonal allergies     Soft tissue mass        PAST SURGICAL HISTORY  Past Surgical History:   Procedure Laterality Date    ABDOMINAL SURGERY      BUNIONECTOMY Right 2013     SECTION N/A 2017    Procedure:  SECTION PRIMARY;  Surgeon: Montserrat Rangel MD;  Location: [facility] LABOR DELIVERY;  Service:       SECTION N/A     COLONOSCOPY      EXCISION MASS TRUNK N/A 5/14/2021    Procedure: ABDOMINAL WALL MASS SOFT TISSUE EXCISION;  Surgeon: Aryan Chavez MD;  Location: [facility] [place];  Service: General;  Laterality: N/A;    JOINT REPLACEMENT      TOTAL HIP ARTHROPLASTY Bilateral 11/12/2018    WISDOM TOOTH EXTRACTION         FAMILY HISTORY  Family History   Problem Relation Age of Onset    Melanoma Father     Hypertension Father     Cancer Father         Melanoma    Depression Mother         In and out, on medicarion    Basal cell carcinoma Brother     Mental illness Brother         Bi Polar    Malig Hyperthermia Neg Hx        SOCIAL HISTORY  Social History     Socioeconomic History    Marital status:    Tobacco Use    Smoking status: Never    Smokeless tobacco: Never    Tobacco comments:     Smoke when drinkkng with friends occasionally   Vaping Use    Vaping status: Never Used   Substance and Sexual Activity    Alcohol use: Yes     Alcohol/week: 4.0 standard drinks of alcohol     Types: 2 Glasses of wine, 2 Cans of beer per week     Comment: socially     Drug use: No    Sexual activity: Yes     Partners: Female     Birth control/protection: Partner of same sex       ALLERGIES  Patient has no known allergies.    REVIEW OF SYSTEMS  Review of Systems  Included in HPI  All systems reviewed and negative except for those discussed in HPI.    PHYSICAL EXAM    I have reviewed the triage vital signs and nursing notes.    ED Triage Vitals   Temp Heart Rate Resp BP SpO2   04/26/25 1024 04/26/25 1024 04/26/25 1024 04/26/25 1028 04/26/25 1024   98.6 °F (37 °C) 91 16 138/76 96 %      Temp src Heart Rate Source Patient Position BP Location FiO2 (%)   04/26/25 1024 -- 04/26/25 1028 04/26/25 1028 --   Tympanic  Sitting Right arm        Physical Exam  Constitutional:       General: She is not in acute distress.     Appearance: Normal appearance. She is not ill-appearing, toxic-appearing or diaphoretic.   HENT:      Head:  Normocephalic and atraumatic.      Nose: Nose normal.      Mouth/Throat:      Mouth: Mucous membranes are moist.   Eyes:      Extraocular Movements: Extraocular movements intact.      Conjunctiva/sclera: Conjunctivae normal.      Pupils: Pupils are equal, round, and reactive to light.   Cardiovascular:      Rate and Rhythm: Normal rate and regular rhythm.      Pulses: Normal pulses.      Heart sounds: Normal heart sounds.   Pulmonary:      Effort: Pulmonary effort is normal.   Abdominal:      General: Abdomen is flat.      Palpations: Abdomen is soft.   Musculoskeletal:         General: Normal range of motion.      Cervical back: Normal range of motion.      Comments: Left thigh has significant ecchymosis to the posterior aspect with associated swelling, no palpable crepitus or fluctuance, distal pulses and sensation intact   Skin:     General: Skin is warm and dry.      Capillary Refill: Capillary refill takes less than 2 seconds.   Neurological:      General: No focal deficit present.      Mental Status: She is alert.   Psychiatric:         Mood and Affect: Mood normal.         Behavior: Behavior normal.         Thought Content: Thought content normal.         Judgment: Judgment normal.         LAB RESULTS  Recent Results (from the past 24 hours)   Comprehensive Metabolic Panel    Collection Time: 04/26/25 11:18 AM    Specimen: Blood   Result Value Ref Range    Glucose 98 65 - 99 mg/dL    BUN 12 6 - 20 mg/dL    Creatinine 0.60 0.57 - 1.00 mg/dL    Sodium 138 136 - 145 mmol/L    Potassium 4.0 3.5 - 5.2 mmol/L    Chloride 105 98 - 107 mmol/L    CO2 22.0 22.0 - 29.0 mmol/L    Calcium 8.9 8.6 - 10.5 mg/dL    Total Protein 6.1 6.0 - 8.5 g/dL    Albumin 3.6 3.5 - 5.2 g/dL    ALT (SGPT) 22 1 - 33 U/L    AST (SGOT) 18 1 - 32 U/L    Alkaline Phosphatase 65 39 - 117 U/L    Total Bilirubin 0.6 0.0 - 1.2 mg/dL    Globulin 2.5 gm/dL    A/G Ratio 1.4 g/dL    BUN/Creatinine Ratio 20.0 7.0 - 25.0    Anion Gap 11.0 5.0 - 15.0  mmol/L    eGFR 110.2 >60.0 mL/min/1.73   Protime-INR    Collection Time: 04/26/25 11:18 AM    Specimen: Blood   Result Value Ref Range    Protime 13.3 11.7 - 14.2 Seconds    INR 1.02 0.90 - 1.10   aPTT    Collection Time: 04/26/25 11:18 AM    Specimen: Blood   Result Value Ref Range    PTT 24.9 22.7 - 35.4 seconds   CBC Auto Differential    Collection Time: 04/26/25 11:18 AM    Specimen: Blood   Result Value Ref Range    WBC 9.14 3.40 - 10.80 10*3/mm3    RBC 3.61 (L) 3.77 - 5.28 10*6/mm3    Hemoglobin 12.0 12.0 - 15.9 g/dL    Hematocrit 35.5 34.0 - 46.6 %    MCV 98.3 (H) 79.0 - 97.0 fL    MCH 33.2 (H) 26.6 - 33.0 pg    MCHC 33.8 31.5 - 35.7 g/dL    RDW 12.1 (L) 12.3 - 15.4 %    RDW-SD 44.3 37.0 - 54.0 fl    MPV 10.6 6.0 - 12.0 fL    Platelets 297 140 - 450 10*3/mm3    Neutrophil % 81.4 (H) 42.7 - 76.0 %    Lymphocyte % 11.1 (L) 19.6 - 45.3 %    Monocyte % 5.8 5.0 - 12.0 %    Eosinophil % 1.0 0.3 - 6.2 %    Basophil % 0.4 0.0 - 1.5 %    Immature Grans % 0.3 0.0 - 0.5 %    Neutrophils, Absolute 7.44 (H) 1.70 - 7.00 10*3/mm3    Lymphocytes, Absolute 1.01 0.70 - 3.10 10*3/mm3    Monocytes, Absolute 0.53 0.10 - 0.90 10*3/mm3    Eosinophils, Absolute 0.09 0.00 - 0.40 10*3/mm3    Basophils, Absolute 0.04 0.00 - 0.20 10*3/mm3    Immature Grans, Absolute 0.03 0.00 - 0.05 10*3/mm3    nRBC 0.0 0.0 - 0.2 /100 WBC   CK    Collection Time: 04/26/25 11:18 AM    Specimen: Blood   Result Value Ref Range    Creatine Kinase 57 20 - 180 U/L       RADIOLOGY  CT Lower Extremity Left With Contrast  Result Date: 4/26/2025  CT LOWER EXTREMITY LEFT W CONTRAST-  HISTORY: Left thigh injury, progressive swelling and ecchymosis of the posterior thigh, concern for large hematoma  TECHNIQUE: CT includes imaging through the lower extremitywith IV contrast. Data reconstructed in coronal and sagittal planes. Radiation dose reduction techniques were utilized including automated exposure control and exposure modulation based on body size.  COMPARISON:  CT abdomen and pelvis 11/29/2022.  FINDINGS: There is a left hamstring fluid collection largest at the level of the proximal to mid thigh centered posterolateral to the left adductor derrick muscle partially within the semimembranosus and semitendinosus muscles. There is likely an associated hamstring origin tear with retraction as there are hamstring origin tendon fibers which appear retracted. The fluid collection measures approximately 8 x 5 cm at the level of the mid thigh and extends 15 cm craniocaudal. There are hyperdense nodular areas along the posterior margin of this collection most likely representing blood products and there appears to be an hematocrit, fluid-fluid level as demonstrated on axial image 84.  Left total hip arthroplasty. No periprosthetic fracture. Mild-to-moderate arthritic changes at the left knee.      1. Large proximal to mid left hamstring fluid collection is consistent with a hematoma measuring approximately 8 x 5 x 15 cm. This contains areas of hemorrhage along its posterior margin and there is an hematocrit level. Hematoma is likely associated with a left hamstring origin tear and retracted hamstring origin fibers are present. Clinical follow-up is recommended to assure resolution as a mass that has bled would be difficult to differentiate from hematoma with CT. 2. Left total hip arthroplasty.         MEDICATIONS GIVEN IN ER  Medications   morphine injection 4 mg (4 mg Intravenous Given 4/26/25 1123)   ondansetron (ZOFRAN) injection 4 mg (4 mg Intravenous Given 4/26/25 1120)   sodium chloride 0.9 % bolus 1,000 mL (1,000 mL Intravenous New Bag 4/26/25 1119)   iopamidol (ISOVUE-300) 61 % injection 100 mL (85 mL Intravenous Given by Other 4/26/25 1239)       ORDERS PLACED DURING THIS VISIT:  Orders Placed This Encounter   Procedures    CT Lower Extremity Left With Contrast    Comprehensive Metabolic Panel    Protime-INR    aPTT    CBC Auto Differential    CK    Ambulatory Referral to  Orthopedic Surgery (All except Pad)    CBC & Differential       OUTPATIENT MEDICATION MANAGEMENT:  No current Epic-ordered facility-administered medications on file.     Current Outpatient Medications Ordered in Epic   Medication Sig Dispense Refill    ALPRAZolam (XANAX) 0.25 MG tablet Take 0.25 mg by mouth As Needed for Anxiety.      escitalopram (LEXAPRO) 20 MG tablet Take 20 mg by mouth Daily.      HYDROcodone-acetaminophen (NORCO) 7.5-325 MG per tablet Take 1 tablet by mouth Every 4 (Four) Hours As Needed for Moderate Pain. 16 tablet 0    Zolpidem Tartrate (AMBIEN PO) Take 10 mg by mouth At Night As Needed.         PROCEDURES  Procedures    PROGRESS, DATA ANALYSIS, CONSULTS, AND MEDICAL DECISION MAKING  All labs have been independently interpreted by me.  All radiology studies have been reviewed by me. All EKG's have been independently viewed and interpreted by me.  Discussion below represents my analysis of pertinent findings related to patient's condition, differential diagnosis, treatment plan and final disposition.    Differential diagnosis includes but is not limited to torn hamstring, thigh hematoma, rhabdomyolysis.    Clinical Scores:                   ED Course as of 04/26/25 1337   Sat Apr 26, 2025   1220 Hemoglobin: 12.0 [AB]   1220 WBC: 9.14 [AB]   1321 CT Lower Extremity Left With Contrast  My independent interpretation of the imaging study is large hematoma within the left hamstring [AB]   1331 Updated patient on results.  Patient is feeling improved.  Informed of hematoma.  There is no evidence of rhabdomyolysis.  Patient has normal CK.  No evidence of vascular compromise distally.  Patient appropriate for outpatient management with orthopedics.  She has a scheduled MRI on Monday.  Will provide referral for orthopedics follow-up for her hamstring injury.  Will also provide analgesia for home use.  Encouraged her to use ice packs as well as leg elevation to help with swelling. [AB]   1335  Prescription drug monitoring program review:     JAZZY query complete and reviewed. Treatment plan to include limited course of prescribed controlled substance. Risks including addiction, benefits, and alternatives presented to patient.   [AB]      ED Course User Index  [AB] Adria Juárez MD             AS OF 13:37 EDT VITALS:    BP - 132/83  HR - 72  TEMP - 98.6 °F (37 °C) (Tympanic)  O2 SATS - 95%    COMPLEXITY OF CARE  Admission was considered but after careful review of the patient's presentation, physical examination, diagnostic results, and response to treatment the patient may be safely discharged with outpatient follow-up.      DIAGNOSIS  Final diagnoses:   Hematoma of left thigh, initial encounter   Left hamstring injury, initial encounter         DISPOSITION  ED Disposition       ED Disposition   Discharge    Condition   Stable    Comment   --                Please note that portions of this document were completed with a voice recognition program.    Note Disclaimer: At [facility], we believe that sharing information builds trust and better relationships. You are receiving this note because you recently visited [facility]. It is possible you will see health information before a provider has talked with you about it. This kind of information can be easy to misunderstand. To help you fully understand what it means for your health, we urge you to discuss this note with your provider.         Adria Juárez MD  04/26/25 [id number]

## (undated) DEVICE — GLV SURG BIOGEL LTX PF 6 1/2

## (undated) DEVICE — 3M™ STERI-STRIP™ COMPOUND BENZOIN TINCTURE 40 BAGS/CARTON 4 CARTONS/CASE C1544: Brand: 3M™ STERI-STRIP™

## (undated) DEVICE — PATIENT RETURN ELECTRODE, SINGLE-USE, CONTACT QUALITY MONITORING, ADULT, WITH 9FT CORD, FOR PATIENTS WEIGING OVER 33LBS. (15KG): Brand: MEGADYNE

## (undated) DEVICE — 3M™ MEDIPORE™ H SOFT CLOTH SURGICAL TAPE 2864, 4 INCH X 10 YARD (10CM X 9,14M), 12 ROLLS/CASE: Brand: 3M™ MEDIPORE™

## (undated) DEVICE — SUT ETHLN 3/0 PS1 18IN 1663H

## (undated) DEVICE — SUT PDS 0 CT1 36IN Z346H

## (undated) DEVICE — SOL IRR H2O BTL 1000ML STRL

## (undated) DEVICE — SUT VIC 0 CTX 36IN J978H

## (undated) DEVICE — GLV SURG PREMIERPRO ORTHO LTX PF SZ7.5 BRN

## (undated) DEVICE — ELECTRD BLD EZ CLN MOD XLNG 2.75IN

## (undated) DEVICE — SKIN PREP TRAY W/CHG: Brand: MEDLINE INDUSTRIES, INC.

## (undated) DEVICE — KENDALL SCD EXPRESS SLEEVES, KNEE LENGTH, MEDIUM: Brand: KENDALL SCD

## (undated) DEVICE — NDL BLNT 18G 1 1/2IN

## (undated) DEVICE — KT ART BLD GAS QUICK DRAW

## (undated) DEVICE — Device

## (undated) DEVICE — SUT VIC 5/0 PS2 18IN J495H

## (undated) DEVICE — ADHS SKIN DERMABOND TOP ADVANCED

## (undated) DEVICE — PENCL ES MEGADINE EZ/CLEAN BUTN W/HOLSTR 10FT

## (undated) DEVICE — ADHS SKIN SURG TISS VISC PREMIERPRO EXOFIN HI/VISC FAST/DRY

## (undated) DEVICE — PK PROC MINOR TOWER 40

## (undated) DEVICE — JACKSON-PRATT 100CC BULB RESERVOIR: Brand: CARDINAL HEALTH

## (undated) DEVICE — SUT VIC 3/0 CTI 36IN J944H

## (undated) DEVICE — SUT VIC 0 CT 36IN J958H

## (undated) DEVICE — SUT VIC 3/0 SH 27IN J416H

## (undated) DEVICE — UNDYED BRAIDED (POLYGLACTIN 910), SYNTHETIC ABSORBABLE SUTURE: Brand: COATED VICRYL